# Patient Record
Sex: FEMALE | Race: WHITE | HISPANIC OR LATINO
[De-identification: names, ages, dates, MRNs, and addresses within clinical notes are randomized per-mention and may not be internally consistent; named-entity substitution may affect disease eponyms.]

---

## 2018-08-22 ENCOUNTER — APPOINTMENT (OUTPATIENT)
Dept: MAMMOGRAPHY | Facility: CLINIC | Age: 43
End: 2018-08-22

## 2018-08-22 ENCOUNTER — OUTPATIENT (OUTPATIENT)
Dept: OUTPATIENT SERVICES | Facility: HOSPITAL | Age: 43
LOS: 1 days | End: 2018-08-22
Payer: COMMERCIAL

## 2018-08-22 DIAGNOSIS — Z00.00 ENCOUNTER FOR GENERAL ADULT MEDICAL EXAMINATION WITHOUT ABNORMAL FINDINGS: ICD-10-CM

## 2018-08-22 PROCEDURE — 77063 BREAST TOMOSYNTHESIS BI: CPT

## 2018-08-22 PROCEDURE — 77067 SCR MAMMO BI INCL CAD: CPT | Mod: 26

## 2018-08-22 PROCEDURE — 77067 SCR MAMMO BI INCL CAD: CPT

## 2018-08-22 PROCEDURE — 77063 BREAST TOMOSYNTHESIS BI: CPT | Mod: 26

## 2019-05-14 ENCOUNTER — OUTPATIENT (OUTPATIENT)
Dept: OUTPATIENT SERVICES | Facility: HOSPITAL | Age: 44
LOS: 1 days | End: 2019-05-14
Payer: COMMERCIAL

## 2019-05-14 ENCOUNTER — APPOINTMENT (OUTPATIENT)
Dept: ULTRASOUND IMAGING | Facility: CLINIC | Age: 44
End: 2019-05-14
Payer: COMMERCIAL

## 2019-05-14 DIAGNOSIS — N64.89 OTHER SPECIFIED DISORDERS OF BREAST: ICD-10-CM

## 2019-05-14 PROCEDURE — 76641 ULTRASOUND BREAST COMPLETE: CPT | Mod: 26,50

## 2019-05-14 PROCEDURE — 76641 ULTRASOUND BREAST COMPLETE: CPT

## 2021-04-17 ENCOUNTER — APPOINTMENT (OUTPATIENT)
Dept: MAMMOGRAPHY | Facility: CLINIC | Age: 46
End: 2021-04-17
Payer: COMMERCIAL

## 2021-04-17 ENCOUNTER — OUTPATIENT (OUTPATIENT)
Dept: OUTPATIENT SERVICES | Facility: HOSPITAL | Age: 46
LOS: 1 days | End: 2021-04-17
Payer: COMMERCIAL

## 2021-04-17 DIAGNOSIS — Z12.31 ENCOUNTER FOR SCREENING MAMMOGRAM FOR MALIGNANT NEOPLASM OF BREAST: ICD-10-CM

## 2021-04-17 PROCEDURE — 77067 SCR MAMMO BI INCL CAD: CPT | Mod: 26

## 2021-04-17 PROCEDURE — 77063 BREAST TOMOSYNTHESIS BI: CPT | Mod: 26

## 2021-04-17 PROCEDURE — 77063 BREAST TOMOSYNTHESIS BI: CPT

## 2021-04-17 PROCEDURE — 77067 SCR MAMMO BI INCL CAD: CPT

## 2021-05-31 ENCOUNTER — EMERGENCY (EMERGENCY)
Facility: HOSPITAL | Age: 46
LOS: 1 days | Discharge: HOME | End: 2021-05-31
Attending: STUDENT IN AN ORGANIZED HEALTH CARE EDUCATION/TRAINING PROGRAM
Payer: COMMERCIAL

## 2021-05-31 VITALS
TEMPERATURE: 98 F | RESPIRATION RATE: 18 BRPM | DIASTOLIC BLOOD PRESSURE: 102 MMHG | OXYGEN SATURATION: 99 % | HEART RATE: 86 BPM | SYSTOLIC BLOOD PRESSURE: 165 MMHG

## 2021-05-31 VITALS
RESPIRATION RATE: 18 BRPM | OXYGEN SATURATION: 98 % | DIASTOLIC BLOOD PRESSURE: 81 MMHG | TEMPERATURE: 98 F | HEART RATE: 64 BPM | SYSTOLIC BLOOD PRESSURE: 124 MMHG

## 2021-05-31 LAB
ALBUMIN SERPL ELPH-MCNC: 3.9 G/DL — SIGNIFICANT CHANGE UP (ref 3.3–5.2)
ALP SERPL-CCNC: 62 U/L — SIGNIFICANT CHANGE UP (ref 40–120)
ALT FLD-CCNC: 14 U/L — SIGNIFICANT CHANGE UP
ANION GAP SERPL CALC-SCNC: 8 MMOL/L — SIGNIFICANT CHANGE UP (ref 5–17)
AST SERPL-CCNC: 20 U/L — SIGNIFICANT CHANGE UP
BASOPHILS # BLD AUTO: 0.02 K/UL — SIGNIFICANT CHANGE UP (ref 0–0.2)
BASOPHILS NFR BLD AUTO: 0.3 % — SIGNIFICANT CHANGE UP (ref 0–2)
BILIRUB SERPL-MCNC: <0.2 MG/DL — LOW (ref 0.4–2)
BUN SERPL-MCNC: 19.1 MG/DL — SIGNIFICANT CHANGE UP (ref 8–20)
CALCIUM SERPL-MCNC: 8.6 MG/DL — SIGNIFICANT CHANGE UP (ref 8.6–10.2)
CHLORIDE SERPL-SCNC: 97 MMOL/L — LOW (ref 98–107)
CO2 SERPL-SCNC: 31 MMOL/L — HIGH (ref 22–29)
CREAT SERPL-MCNC: 0.67 MG/DL — SIGNIFICANT CHANGE UP (ref 0.5–1.3)
EOSINOPHIL # BLD AUTO: 0.11 K/UL — SIGNIFICANT CHANGE UP (ref 0–0.5)
EOSINOPHIL NFR BLD AUTO: 1.5 % — SIGNIFICANT CHANGE UP (ref 0–6)
GLUCOSE SERPL-MCNC: 93 MG/DL — SIGNIFICANT CHANGE UP (ref 70–99)
HCT VFR BLD CALC: 37.9 % — SIGNIFICANT CHANGE UP (ref 34.5–45)
HGB BLD-MCNC: 12.4 G/DL — SIGNIFICANT CHANGE UP (ref 11.5–15.5)
IMM GRANULOCYTES NFR BLD AUTO: 0.3 % — SIGNIFICANT CHANGE UP (ref 0–1.5)
LIDOCAIN IGE QN: 69 U/L — HIGH (ref 22–51)
LYMPHOCYTES # BLD AUTO: 2.88 K/UL — SIGNIFICANT CHANGE UP (ref 1–3.3)
LYMPHOCYTES # BLD AUTO: 38.8 % — SIGNIFICANT CHANGE UP (ref 13–44)
MAGNESIUM SERPL-MCNC: 1.8 MG/DL — SIGNIFICANT CHANGE UP (ref 1.6–2.6)
MCHC RBC-ENTMCNC: 28.3 PG — SIGNIFICANT CHANGE UP (ref 27–34)
MCHC RBC-ENTMCNC: 32.7 GM/DL — SIGNIFICANT CHANGE UP (ref 32–36)
MCV RBC AUTO: 86.5 FL — SIGNIFICANT CHANGE UP (ref 80–100)
MONOCYTES # BLD AUTO: 0.96 K/UL — HIGH (ref 0–0.9)
MONOCYTES NFR BLD AUTO: 12.9 % — SIGNIFICANT CHANGE UP (ref 2–14)
NEUTROPHILS # BLD AUTO: 3.44 K/UL — SIGNIFICANT CHANGE UP (ref 1.8–7.4)
NEUTROPHILS NFR BLD AUTO: 46.2 % — SIGNIFICANT CHANGE UP (ref 43–77)
PLATELET # BLD AUTO: 218 K/UL — SIGNIFICANT CHANGE UP (ref 150–400)
POTASSIUM SERPL-MCNC: 3.6 MMOL/L — SIGNIFICANT CHANGE UP (ref 3.5–5.3)
POTASSIUM SERPL-SCNC: 3.6 MMOL/L — SIGNIFICANT CHANGE UP (ref 3.5–5.3)
PROT SERPL-MCNC: 6.5 G/DL — LOW (ref 6.6–8.7)
RBC # BLD: 4.38 M/UL — SIGNIFICANT CHANGE UP (ref 3.8–5.2)
RBC # FLD: 12.6 % — SIGNIFICANT CHANGE UP (ref 10.3–14.5)
SODIUM SERPL-SCNC: 136 MMOL/L — SIGNIFICANT CHANGE UP (ref 135–145)
TROPONIN T SERPL-MCNC: <0.01 NG/ML — SIGNIFICANT CHANGE UP (ref 0–0.06)
WBC # BLD: 7.43 K/UL — SIGNIFICANT CHANGE UP (ref 3.8–10.5)
WBC # FLD AUTO: 7.43 K/UL — SIGNIFICANT CHANGE UP (ref 3.8–10.5)

## 2021-05-31 PROCEDURE — 71045 X-RAY EXAM CHEST 1 VIEW: CPT | Mod: 26

## 2021-05-31 PROCEDURE — 83735 ASSAY OF MAGNESIUM: CPT

## 2021-05-31 PROCEDURE — 71045 X-RAY EXAM CHEST 1 VIEW: CPT

## 2021-05-31 PROCEDURE — 84484 ASSAY OF TROPONIN QUANT: CPT

## 2021-05-31 PROCEDURE — 93010 ELECTROCARDIOGRAM REPORT: CPT

## 2021-05-31 PROCEDURE — 80053 COMPREHEN METABOLIC PANEL: CPT

## 2021-05-31 PROCEDURE — 99285 EMERGENCY DEPT VISIT HI MDM: CPT

## 2021-05-31 PROCEDURE — 99284 EMERGENCY DEPT VISIT MOD MDM: CPT | Mod: 25

## 2021-05-31 PROCEDURE — 36415 COLL VENOUS BLD VENIPUNCTURE: CPT

## 2021-05-31 PROCEDURE — 83690 ASSAY OF LIPASE: CPT

## 2021-05-31 PROCEDURE — 85025 COMPLETE CBC W/AUTO DIFF WBC: CPT

## 2021-05-31 PROCEDURE — 93005 ELECTROCARDIOGRAM TRACING: CPT

## 2021-05-31 RX ADMIN — Medication 30 MILLILITER(S): at 20:56

## 2021-05-31 NOTE — ED ADULT NURSE NOTE - OBJECTIVE STATEMENT
pt arrived c/o palpations since 6pm tonight starting while she was washing dishes. pt denies pain, but states she felt like she was going to pass out when it started. upon arrival to ED pt states relief of symptoms. has no complaints at this time. aao x4. respirations even and unlabored. NSR on cm. no distress noted. DR Duenas at bedside.

## 2021-05-31 NOTE — ED PROVIDER NOTE - NSFOLLOWUPINSTRUCTIONS_ED_ALL_ED_FT
1) Follow up with your doctor or cardiologist within one week  2) Return to the ER for worsening or concerning symptoms      Heart Palpitations    WHAT YOU NEED TO KNOW:    Heart palpitations are feelings that your heart races, jumps, throbs, or flutters. You may feel extra beats, no beats for a short time, or skipped beats. You may have these feelings in your chest, throat, or neck. They may happen when you are sitting, standing, or lying. Heart palpitations may be frightening, but are usually not caused by a serious problem.     DISCHARGE INSTRUCTIONS:    Call 911 or have someone else call for any of the following:   •You have any of the following signs of a heart attack: ?Squeezing, pressure, or pain in your chest      ?You may also have any of the following: ?Discomfort or pain in your back, neck, jaw, stomach, or arm      ?Shortness of breath      ?Nausea or vomiting      ?Lightheadedness or a sudden cold sweat        •You have any of the following signs of a stroke: ?Numbness or drooping on one side of your face       ?Weakness in an arm or leg      ?Confusion or difficulty speaking      ?Dizziness, a severe headache, or vision loss      •You faint or lose consciousness.       Return to the emergency department if:   •Your palpitations happen more often or get more intense.           Contact your healthcare provider if:   •You have new or worsening swelling in your feet or ankles.      •You have questions or concerns about your condition or care.      Follow up with your healthcare provider as directed: You may need to follow up with a cardiologist. You may need tests to check for heart problems that cause palpitations. Write down your questions so you remember to ask them during your visits.     Keep a record: Write down when your palpitations start and stop, what you were doing when they started, and your symptoms. Keep track of what you ate or drank within a few hours of your palpitations. Include anything that seemed to help your symptoms, such as lying down or holding your breath. This record will help you and your healthcare provider learn what triggers your palpitations. Bring this record with you to your follow up visits.    Help prevent heart palpitations:   •Manage stress and anxiety. Find ways to relax such as listening to music, meditating, or doing yoga. Exercise can also help decrease stress and anxiety. Talk to someone you trust about your stress or anxiety. You can also talk to a therapist.       •Get plenty of sleep every night. Ask your healthcare provider how much sleep you need each night.       •Do not drink caffeine or alcohol. Caffeine and alcohol can make your palpitations worse. Caffeine is found in soda, coffee, tea, chocolate, and drinks that increase your energy.       •Do not smoke. Nicotine and other chemicals in cigarettes and cigars may damage your heart and blood vessels. Ask your healthcare provider for information if you currently smoke and need help to quit. E-cigarettes or smokeless tobacco still contain nicotine. Talk to your healthcare provider before you use these products.       •Do not use illegal drugs. Talk to your healthcare provider if you use illegal drugs and want help to quit.       Palpitaciones cardíacas    LO QUE NECESITA SABER:    Las palpitaciones cardíacas son sensaciones que se perciben ga aceleraciones, wes, latidos o aleteos del corazón. También podría sentir latidos adicionales, que francisco corazón no late por un corto periodo de tiempo o que se saltean los latidos. Puede percibir estas sensaciones en el pecho, la garganta o el rito. Pueden presentarse cuando está sentado, de pie o acostado. Las palpitaciones cardíacas pueden causar temor; sin embargo, generalmente no se deben a un problema importante.    [facility]CCIONES [facility]BRE [facility]:    Llame al 911 o pídale a alguien más que llame en cualquiera de los siguientes casos:  •Tiene alguno de los siguientes signos de un ataque cardíaco: ?Estrujamiento, presión o tensión en francisco pecho      ?Usted también podría presentar alguno de los siguientes: ?Malestar o dolor en francisco espalda, rito, mandíbula, abdomen, o brazo      ?Falta de aliento      ?Náuseas o vómitos      ?Desvanecimiento o sudor frío repentino        •Usted tiene alguno de los siguientes signos de derrame cerebral: ?Adormecimiento o caída de un lado de francisco sangita      ?Debilidad en un brazo o carri pierna      ?Confusión o debilidad para hablar      ?Mareos o dolor de eva intenso, o pérdida de la visión.      •Usted se desmaya o pierde el conocimiento.      Regrese a la rosi de emergencias si:  •Preethi palpitaciones ocurren con más frecuencia o son más intensas.          Comuníquese con francisco médico si:  •Usted tiene nueva inflamación en preethi pies o tobillos o esta ha empeorado.      •Usted tiene preguntas o inquietudes acerca de francisco condición o cuidado.      Acuda a preethi consultas de control con francisco médico según le indicaron.Es posible que necesite un seguimiento con un cardiólogo. Joel vez deba hacerse exámenes para determinar si sufre problemas cardíacos que le causan las palpitaciones. Anote preethi preguntas para que se acuerde de hacerlas gretchen preethi visitas.    Mantenga un registro:Escriba cuándo preethi palpitaciones comienzan y terminan, qué estaba usted haciendo cuando comenzaron y preethi síntomas. Mantenga un registro de lo que usted comió o tomó gretchen las horas antes de preethi palpitaciones. Incluya todo lo que aparentemente le ayudó a que preethi síntomas mejoraran ga acostarse o contener francisco respiración. Mary registro le ayudará a usted y a francisco médico para saber qué provoca preethi palpitaciones. Lleve el registro con usted a preethi citas de seguimiento.    Cómo ayudar a prevenir las palpitaciones cardíacas:  •Controlar el estrés y la ansiedad.Encuentre formas de relajarse, ga escuchar música, meditar o hacer yoga. El ejercicio también puede ayudar a disminuir el estrés y la ansiedad. Hablar con alguien de confianza acerca de francisco estrés o ansiedad. También puede hablar con un psicoterapeuta.      •Duerma lo suficiente cada la noche.Pregunte a francisco médico cuánto debería dormir usted cada noche.      •No tome bebidas con cafeína o alcohol.La cafeína y el alcohol pueden hacer que preethi palpitaciones empeoren. La cafeína se encuentra en refrescos, café, té, chocolate y bebidas que aumentan francisco energía.      •No fume.La nicotina y otros químicos en los cigarrillos y cigarros podrían provocar daño a francisco corazón y a preethi vasos sanguíneos. Pida información a francisco médico si usted actualmente fuma y necesita ayuda para dejar de fumar. Los cigarrillos electrónicos o el tabaco sin humo igualmente contienen nicotina. Consulte con francisco médico antes de utilizar estos productos.      •No use drogas ilegales.Hable con francisco médico si consume drogas ilegales y quiere dejarlas.        Hypertension    WHAT YOU NEED TO KNOW:    Hypertension is high blood pressure. Your blood pressure is the force of your blood moving against the walls of your arteries. Hypertension causes your blood pressure to get so high that your heart has to work much harder than normal. This can damage your heart. The cause of hypertension may not be known. This is called essential or primary hypertension. Hypertension caused by another medical condition, such as kidney disease, is called secondary hypertension.    DISCHARGE INSTRUCTIONS:    Call your local emergency number (911 in the [place]) or have someone call if:   •You have chest pain.      •You have any of the following signs of a heart attack: ?Squeezing, pressure, or pain in your chest      ?You may also have any of the following: ?Discomfort or pain in your back, neck, jaw, stomach, or arm      ?Shortness of breath      ?Nausea or vomiting      ?Lightheadedness or a sudden cold sweat        •You become confused or have trouble speaking.      •You suddenly feel lightheaded or have trouble breathing.      Return to the emergency department if:   •You have a severe headache or vision loss.      •You have weakness in an arm or leg.      Call your doctor if:   •You feel faint, dizzy, confused, or drowsy.      •You have been taking your blood pressure medicine but your pressure is higher than your provider says it should be.      •You have questions or concerns about your condition or care.      Medicines: You may need any of the following:  •Antihypertensives may be used to help lower your blood pressure. Several kinds of medicines are available. Your healthcare provider will choose medicines based on the kind of hypertension you have. You may need more than one type of medicine. Take the medicine exactly as directed.      •Diuretics help decrease extra fluid that collects in your body. This will help lower your blood pressure. You may urinate more often while you take this medicine.      •Cholesterol medicine helps lower your cholesterol level. A low cholesterol level helps prevent heart disease and makes it easier to control your blood pressure.      •Take your medicine as directed. Contact your healthcare provider if you think your medicine is not helping or if you have side effects. Tell him or her if you are allergic to any medicine. Keep a list of the medicines, vitamins, and herbs you take. Include the amounts, and when and why you take them. Bring the list or the pill bottles to follow-up visits. Carry your medicine list with you in case of an emergency.      Follow up with your doctor as directed: You will need to return to have your blood pressure checked and to have other lab tests done. Write down your questions so you remember to ask them during your visits.    Stages of hypertension:     Blood Pressure Readings     •Normal blood pressure is 119/79 or lower. Your healthcare provider may only check your blood pressure each year if it stays at a normal level.      •Elevated blood pressure is 120/79 to 129/79. This is sometimes called prehypertension. Your healthcare provider may suggest lifestyle changes to help lower your blood pressure to a normal level. He or she may then check it again in 3 to 6 months.      •Stage 1 hypertension is 130/80 to 139/89. Your provider may recommend lifestyle changes, medication, and checks every 3 to 6 months until your blood pressure is controlled.      •Stage 2 hypertension is 140/90 or higher. Your provider will recommend lifestyle changes and have you take 2 kinds of hypertension medicines. You will also need to have your blood pressure checked monthly until it is controlled.      Manage hypertension:   •Check your blood pressure at home. Avoid smoking, caffeine, and exercise at least 30 minutes before checking your blood pressure. Sit and rest for 5 minutes before you take your blood pressure. Extend your arm and support it on a flat surface. Your arm should be at the same level as your heart. Follow the directions that came with your blood pressure monitor. Check your blood pressure 2 times, 1 minute apart, before you take your medicine in the morning. Also check your blood pressure before your evening meal. Keep a record of your readings and bring it to your follow-up visits. Ask your healthcare provider what your blood pressure should be.  How to take a Blood Pressure           •Manage any other health conditions you have. Health conditions such as diabetes can increase your risk for hypertension. Follow your healthcare provider's instructions and take all your medicines as directed.      •Ask about all medicines. Certain medicines can increase your blood pressure. Examples include oral birth control pills, decongestants, herbal supplements, and NSAIDs, such as ibuprofen. Your healthcare provider can tell you which medicines are safe for you to take. This includes prescription and over-the-counter medicines.      Lifestyle changes you can make to manage hypertension: Your healthcare provider may recommend you work with a team to manage hypertension. The team may include medical experts such as a dietitian, an exercise or physical therapist, and a behavior therapist. Your family members may be included in helping you create lifestyle changes.  •Limit sodium (salt) as directed. Too much sodium can affect your fluid balance. Check labels to find low-sodium or no-salt-added foods. Some low-sodium foods use potassium salts for flavor. Too much potassium can also cause health problems. Your healthcare provider will tell you how much sodium and potassium are safe for you to have in a day. He or she may recommend that you limit sodium to 2,300 mg a day.             •Follow the meal plan recommended by your healthcare provider. A dietitian or your provider can give you more information on low-sodium plans or the DASH (Dietary Approaches to Stop Hypertension) eating plan. The DASH plan is low in sodium, processed sugar, unhealthy fats, and total fat. It is high in potassium, calcium, and fiber. These can be found in vegetables, fruit, and whole-grain foods.             •Be physically active throughout the day. Physical activity, such as exercise, can help control your blood pressure and your weight. Be physically active for at least 30 minutes per day, on most days of the week. Include aerobic activity, such as walking or riding a bicycle. Also include strength training at least 2 times each week. Your healthcare providers can help you create a physical activity plan.   Family Walking for Exercise       Strength Training for Adults           •Decrease stress. This may help lower your blood pressure. Learn ways to relax, such as deep breathing or listening to music.      •Limit alcohol as directed. Alcohol can increase your blood pressure. A drink of alcohol is 12 ounces of beer, 5 ounces of wine, or 1½ ounces of liquor.      •Do not smoke. Nicotine and other chemicals in cigarettes and cigars can increase your blood pressure and also cause lung damage. Ask your healthcare provider for information if you currently smoke and need help to quit. E-cigarettes or smokeless tobacco still contain nicotine. Talk to your healthcare provider before you use these products.  Prevent Heart Disease     Hipertensión    LO QUE NECESITA SABER:    La hipertensión es la presión arterial kaushal. La presión arterial es la fuerza que ejerce la celine al moverse contra las otero de las arterias. La hipertensión causa que francisco presión arterial se eleve tanto que francisco corazón se ve forzado a trabajar mucho más daniel que lo normal. [place] puede dañar francisco corazón. Puede que no se conozca la causa de la hipertensión. [place] se denomina hipertensión esencial o primaria. La hipertensión causada por otra afección médica, tales ga la enfermedad renal, se denomina hipertensión secundaria.    INSTRUCCIONES SOBRE [facility]:    Llame al número local de emergencias (911 en los [place]) o pídale a alguien que llame si:  •Usted tiene dolor en el pecho.      •Tiene alguno de los siguientes signos de un ataque cardíaco: ?Estrujamiento, presión o tensión en francisco pecho      ?Usted también podría presentar alguno de los siguientes: ?Malestar o dolor en francisco espalda, rito, mandíbula, abdomen, o brazo      ?Falta de aliento      ?Náuseas o vómitos      ?Desvanecimiento o sudor frío repentino        •Usted se siente confundido o tiene dificultad para hablar.      •Repentinamente se siente aturdido o con dificultad para respirar.      Regrese a la rosi de emergencias si:  •Usted tiene un daniel dolor de eva o pérdida de la visión.      •Usted tiene debilidad en un brazo o en carri pierna.      Llame a francisco médico si:  •Usted se siente mareado, confundido, somnoliento o ga si se fuera a desmayar.      •Usted ha estado tomando medicamento para la presión arterial yves todavía está en un nivel más elevado del que francisco médico le indicó que debería estar.      •Usted tiene preguntas o inquietudes acerca de francisco condición o cuidado.      Medicamentos:Es posible que usted necesite alguno de los siguientes:  •Los antihipertensivospodrían usarse para ayudar a disminuir la presión arterial. Varios tipos de medicamentos están disponibles. Francisco médico elegirá medicamentos basados en el tipo de hipertensión que tiene. Es posible que necesite más de un tipo de medicamento. [place] el medicamento exactamente ga indicado.      •Los diuréticosayudan a eliminar el exceso de líquido que se acumula en el organismo. [place] ayudará a bajar francisco presión arterial. Es posible que orine más seguido mientras migel mary medicamento.      •Los medicamentos para el colesterolayudan a bajar los niveles de colesterol. Un nivel bajo de colesterol ayuda a prevenir enfermedades cardíacas y facilita el control de la presión arterial.      •[place] preethi medicamentos ga se le haya indicado.Consulte con francisco médico si usted cristy que francisco medicamento no le está ayudando o si presenta efectos secundarios. Infórmele si es alérgico a cualquier medicamento. Mantenga carri lista actualizada de los medicamentos, las vitaminas y los productos herbales que migel. Incluya los siguientes datos de los medicamentos: cantidad, frecuencia y motivo de administración. Traiga con usted la lista o los envases de las píldoras a preethi citas de seguimiento. Lleve la lista de los medicamentos con usted en yenny de carri emergencia.      Acuda a la consulta de control con francisco médico según las indicaciones:Usted necesitará regresar para medir francisco presión arterial y realizar otros exámenes de laboratorio. Anote preethi preguntas para que se acuerde de hacerlas gretchen preethi visitas.    [place] de la hipertensión:    Lecturas de la presión arterial     •Carri presión arterial normal es 119/79 o inferior. Francisco médico podría comprobar francisco presión arterial solamente cada año si se mantiene en un nivel normal.      •Carri presión arterial elevada es de 120/79 a 129/79. A veces esto se llama prehipertensión. Francisco médico puede sugerir cambios de estilo de denzel para ayudar a bajar la presión arterial a un nivel normal. Entonces él podría comprobar francisco presión otra vez en 3 a 6 meses.      •La etapa 1 de la hipertensión es de 130/80 a 139/89. Francisco médico podría recomendar cambios de estilo de denzel, medicamentos y controles cada 3 a 6 meses hasta que francisco presión arterial esté controlada.      •La etapa 2 de la hipertensión es de 140/90 o mayor. Francisco médico le recomendará cambios en el estilo de denzel y le indicará 2 clases de medicamentos para la hipertensión. También necesitará controlar francisco presión arterial cada mes hasta que esté controlada.      Maneje francisco hipertensión:  •Controle francisco presión arterial en casa.Evite fumar, consumir cafeína y hacer ejercicio al menos 30 minutos antes de controlar francisco presión arterial. Siéntese y descanse por 5 minutos antes de tomarse la presión arterial. Extienda francisco brazo y apóyelo en carri superficie plana. Francisco brazo debe estar a la misma altura que francisco corazón. Siga las instrucciones que vienen con el monitor para la presión arterial. Controle francisco presión arterial 2 veces, con diferencia de 1 minuto, antes de mir francisco medicamento por la mañana. También controle francisco presión arterial antes de la carol. Mantenga un registro de francisco peso y llévelo con usted a las citas de control. Pregúntele a francisco médico cuál debería ser francisco presión arterial.  Cómo mir la presión arterial           •Controle cualquier otra condición médica que usted tenga.Algunas condiciones médicas ga la diabetes pueden aumentar francisco riesgo de hipertensión. Siga las instrucciones de francisco médico y tómese preethi medicamentos según dichas instrucciones.      •Pregunte sobre los medicamentos.Ciertos medicamentos pueden aumentar francisco presión arterial. Los ejemplos incluyen las píldoras anticonceptivas orales, los descongestivos, los suplementos herbales y los ITZ, ga el ibuprofeno. Francisco médico puede indicarle qué medicamentos son seguros para usted. Estos medicamentos incluyen los recetados y de venta jing.      Cambios de estilo de denzel que usted puede hacer para manejar la hipertensión:Francisco médico puede recomendarle que trabaje con un equipo para controlar la hipertensión. El equipo puede incluir expertos médicos, ga un dietista, un fisioterapeuta o un terapeuta del comportamiento. Los miembros de francisco zuleika pueden participar en la creación de cambios en el estilo de denzel.  •Limite el sodio (la sal) ga se le haya indicado.Demasiado sodio puede afectar el equilibrio de líquidos. Revise las etiquetas para buscar alimentos bajos en sodio o sin sal agregada. Algunos alimentos bajos en sodio utilizan sales de potasio para añadir sabor. Demasiado potasio también puede causar problemas de lora. Francisco médico le dirá qué cantidad de sodio y potasio es lee para el consumo en un día. Él puede recomendarle que limite el sodio a 2,300 mg al día.             •Siga el plan de comidas recomendado por francisco médico.Un dietista o médico puede darle más información sobre planes de bajo contenido de sodio o el plan de alimentación DASH (enfoques dietéticos para detener la hipertensión). El plan DASH es bajo en sodio, azúcar procesada, grasas dañinas y grasas totales. Es alto en potasio, calcio y fibra. Estos se encuentran en las verduras, las frutas y los alimentos integrales.             •Manténgase físicamente activo gretchen todo el día.La actividad física, ga el ejercicio, puede ayudar a controlar francisco presión arterial y francisco peso. Layla actividad física por lo menos 30 minutos al día, la mayoría de los días de la semana. Incluya carri actividad aeróbica, ga caminar o montar en bicicleta. Incluya también entrenamiento de fuerza al menos 2 veces por semana. Preethi médicos pueden ayudarle a crear un plan de actividad física.  Zuleika hispana caminando ga ejercicio       Entrenamiento de fuerza para adultos           •Disminuya el estrés.Es posible que esto contribuya a bajar francisco presión arterial. Aprenda sobre formas de relajarse, ga respiración profunda o escuchar música.      •Limite el consumo de alcohol según le indicaron.El alcohol puede aumentar la presión arterial. Un trago equivale a 12 onzas de cerveza, 5 onzas de vino o 1 onza y ½ de licor.      •No fume.La nicotina y otros químicos en los cigarrillos y cigarros pueden aumentar francisco presión arterial y también provocar daño al pulmón. Pida información a francisco médico si usted actualmente fuma y necesita ayuda para dejar de fumar. Los cigarrillos electrónicos o el tabaco sin humo igualmente contienen nicotina. Consulte con francisco médico antes de utilizar estos productos.  Evite la enfermedad cardíaca

## 2021-05-31 NOTE — ED PROVIDER NOTE - CHPI ED SYMPTOMS NEG
no back pain/no chest pain/no cough/no dizziness/no fever/no shortness of breath/no syncope/no vomiting/no chills/no diaphoresis

## 2021-05-31 NOTE — ED PROVIDER NOTE - CLINICAL SUMMARY MEDICAL DECISION MAKING FREE TEXT BOX
Patient with brief episode of palpitations which has resolved. No significant findings at this time.

## 2021-05-31 NOTE — ED PROVIDER NOTE - PATIENT PORTAL LINK FT
You can access the FollowMyHealth Patient Portal offered by [facility] by registering at the following website: [email or web address]. By joining [employer]’s FollowMyHealth portal, you will also be able to view your health information using other applications (apps) compatible with our system.

## 2021-05-31 NOTE — ED PROVIDER NOTE - OBJECTIVE STATEMENT
Patient presents for evaluation of brief episode of palpitations that occurred about 630 pm. Patient with recently had HCTZ added to her TN regimen this past saturday after being seen by her primary care doctor due to recurrent htn this week. Patient also endorses poor sleep the past week for unclear reason.

## 2021-05-31 NOTE — ED ADULT TRIAGE NOTE - CHIEF COMPLAINT QUOTE
Pt. BIBA for palpitations. Pt. states they lasted ten minutes, denies chest pain or SOB. Pt. also complaining of pressure behind her neck.  Pt. states this has happened in the past, never seen for it  before. Pt. has hx of HTN. Pt. has NS in from EMS.

## 2021-05-31 NOTE — ED ADULT NURSE NOTE - EXTENSIONS OF SELF_ADULT
Call [phone], Monday through Friday between the hours of 9 a.m. and 4 p.m. to speak with a representative or schedule your initial screening consultation     None

## 2021-08-03 ENCOUNTER — EMERGENCY (EMERGENCY)
Facility: HOSPITAL | Age: 46
LOS: 1 days | Discharge: HOME | End: 2021-08-03
Attending: EMERGENCY MEDICINE
Payer: COMMERCIAL

## 2021-08-03 VITALS
HEART RATE: 69 BPM | RESPIRATION RATE: 14 BRPM | OXYGEN SATURATION: 100 % | SYSTOLIC BLOOD PRESSURE: 152 MMHG | DIASTOLIC BLOOD PRESSURE: 87 MMHG

## 2021-08-03 VITALS
RESPIRATION RATE: 20 BRPM | HEART RATE: 72 BPM | TEMPERATURE: 98 F | HEIGHT: 62 IN | OXYGEN SATURATION: 98 % | WEIGHT: 160.06 LBS | DIASTOLIC BLOOD PRESSURE: 100 MMHG | SYSTOLIC BLOOD PRESSURE: 158 MMHG

## 2021-08-03 LAB
ALBUMIN SERPL ELPH-MCNC: 4.6 G/DL — SIGNIFICANT CHANGE UP (ref 3.3–5.2)
ALP SERPL-CCNC: 65 U/L — SIGNIFICANT CHANGE UP (ref 40–120)
ALT FLD-CCNC: 18 U/L — SIGNIFICANT CHANGE UP
ANION GAP SERPL CALC-SCNC: 12 MMOL/L — SIGNIFICANT CHANGE UP (ref 5–17)
AST SERPL-CCNC: 22 U/L — SIGNIFICANT CHANGE UP
BASOPHILS # BLD AUTO: 0.03 K/UL — SIGNIFICANT CHANGE UP (ref 0–0.2)
BASOPHILS NFR BLD AUTO: 0.3 % — SIGNIFICANT CHANGE UP (ref 0–2)
BILIRUB SERPL-MCNC: 0.3 MG/DL — LOW (ref 0.4–2)
BUN SERPL-MCNC: 13.7 MG/DL — SIGNIFICANT CHANGE UP (ref 8–20)
CALCIUM SERPL-MCNC: 9.6 MG/DL — SIGNIFICANT CHANGE UP (ref 8.6–10.2)
CHLORIDE SERPL-SCNC: 99 MMOL/L — SIGNIFICANT CHANGE UP (ref 98–107)
CO2 SERPL-SCNC: 26 MMOL/L — SIGNIFICANT CHANGE UP (ref 22–29)
CREAT SERPL-MCNC: 0.58 MG/DL — SIGNIFICANT CHANGE UP (ref 0.5–1.3)
EOSINOPHIL # BLD AUTO: 0.05 K/UL — SIGNIFICANT CHANGE UP (ref 0–0.5)
EOSINOPHIL NFR BLD AUTO: 0.6 % — SIGNIFICANT CHANGE UP (ref 0–6)
GLUCOSE SERPL-MCNC: 102 MG/DL — HIGH (ref 70–99)
HCT VFR BLD CALC: 41.6 % — SIGNIFICANT CHANGE UP (ref 34.5–45)
HGB BLD-MCNC: 13.8 G/DL — SIGNIFICANT CHANGE UP (ref 11.5–15.5)
IMM GRANULOCYTES NFR BLD AUTO: 0.2 % — SIGNIFICANT CHANGE UP (ref 0–1.5)
LYMPHOCYTES # BLD AUTO: 3.01 K/UL — SIGNIFICANT CHANGE UP (ref 1–3.3)
LYMPHOCYTES # BLD AUTO: 34.8 % — SIGNIFICANT CHANGE UP (ref 13–44)
MAGNESIUM SERPL-MCNC: 2.1 MG/DL — SIGNIFICANT CHANGE UP (ref 1.6–2.6)
MCHC RBC-ENTMCNC: 28.4 PG — SIGNIFICANT CHANGE UP (ref 27–34)
MCHC RBC-ENTMCNC: 33.2 GM/DL — SIGNIFICANT CHANGE UP (ref 32–36)
MCV RBC AUTO: 85.6 FL — SIGNIFICANT CHANGE UP (ref 80–100)
MONOCYTES # BLD AUTO: 0.77 K/UL — SIGNIFICANT CHANGE UP (ref 0–0.9)
MONOCYTES NFR BLD AUTO: 8.9 % — SIGNIFICANT CHANGE UP (ref 2–14)
NEUTROPHILS # BLD AUTO: 4.76 K/UL — SIGNIFICANT CHANGE UP (ref 1.8–7.4)
NEUTROPHILS NFR BLD AUTO: 55.2 % — SIGNIFICANT CHANGE UP (ref 43–77)
PLATELET # BLD AUTO: 227 K/UL — SIGNIFICANT CHANGE UP (ref 150–400)
POTASSIUM SERPL-MCNC: 3.8 MMOL/L — SIGNIFICANT CHANGE UP (ref 3.5–5.3)
POTASSIUM SERPL-SCNC: 3.8 MMOL/L — SIGNIFICANT CHANGE UP (ref 3.5–5.3)
PROT SERPL-MCNC: 7.9 G/DL — SIGNIFICANT CHANGE UP (ref 6.6–8.7)
RBC # BLD: 4.86 M/UL — SIGNIFICANT CHANGE UP (ref 3.8–5.2)
RBC # FLD: 12.6 % — SIGNIFICANT CHANGE UP (ref 10.3–14.5)
SODIUM SERPL-SCNC: 137 MMOL/L — SIGNIFICANT CHANGE UP (ref 135–145)
TROPONIN T SERPL-MCNC: <0.01 NG/ML — SIGNIFICANT CHANGE UP (ref 0–0.06)
TSH SERPL-MCNC: 0.54 UIU/ML — SIGNIFICANT CHANGE UP (ref 0.27–4.2)
WBC # BLD: 8.64 K/UL — SIGNIFICANT CHANGE UP (ref 3.8–10.5)
WBC # FLD AUTO: 8.64 K/UL — SIGNIFICANT CHANGE UP (ref 3.8–10.5)

## 2021-08-03 PROCEDURE — 99284 EMERGENCY DEPT VISIT MOD MDM: CPT

## 2021-08-03 PROCEDURE — 99284 EMERGENCY DEPT VISIT MOD MDM: CPT | Mod: 25

## 2021-08-03 PROCEDURE — 36415 COLL VENOUS BLD VENIPUNCTURE: CPT

## 2021-08-03 PROCEDURE — 84443 ASSAY THYROID STIM HORMONE: CPT

## 2021-08-03 PROCEDURE — 85025 COMPLETE CBC W/AUTO DIFF WBC: CPT

## 2021-08-03 PROCEDURE — 80053 COMPREHEN METABOLIC PANEL: CPT

## 2021-08-03 PROCEDURE — 83735 ASSAY OF MAGNESIUM: CPT

## 2021-08-03 PROCEDURE — 84484 ASSAY OF TROPONIN QUANT: CPT

## 2021-08-03 PROCEDURE — 93010 ELECTROCARDIOGRAM REPORT: CPT

## 2021-08-03 PROCEDURE — 93005 ELECTROCARDIOGRAM TRACING: CPT

## 2021-08-03 PROCEDURE — 71046 X-RAY EXAM CHEST 2 VIEWS: CPT | Mod: 26

## 2021-08-03 PROCEDURE — 71046 X-RAY EXAM CHEST 2 VIEWS: CPT

## 2021-08-03 PROCEDURE — 96374 THER/PROPH/DIAG INJ IV PUSH: CPT

## 2021-08-03 RX ORDER — METOCLOPRAMIDE HCL 10 MG
10 TABLET ORAL ONCE
Refills: 0 | Status: COMPLETED | OUTPATIENT
Start: 2021-08-03 | End: 2021-08-03

## 2021-08-03 RX ORDER — ACETAMINOPHEN 500 MG
650 TABLET ORAL ONCE
Refills: 0 | Status: COMPLETED | OUTPATIENT
Start: 2021-08-03 | End: 2021-08-03

## 2021-08-03 RX ORDER — SODIUM CHLORIDE 9 MG/ML
1000 INJECTION INTRAMUSCULAR; INTRAVENOUS; SUBCUTANEOUS ONCE
Refills: 0 | Status: COMPLETED | OUTPATIENT
Start: 2021-08-03 | End: 2021-08-03

## 2021-08-03 RX ADMIN — Medication 650 MILLIGRAM(S): at 21:16

## 2021-08-03 RX ADMIN — SODIUM CHLORIDE 1000 MILLILITER(S): 9 INJECTION INTRAMUSCULAR; INTRAVENOUS; SUBCUTANEOUS at 21:16

## 2021-08-03 RX ADMIN — Medication 104 MILLIGRAM(S): at 21:16

## 2021-08-03 NOTE — ED PROVIDER NOTE - PATIENT PORTAL LINK FT
You can access the FollowMyHealth Patient Portal offered by [facility] by registering at the following website: [email or web address]. By joining [employer]’s FollowMyHealth portal, you will also be able to view your health information using other applications (apps) compatible with our system.

## 2021-08-03 NOTE — ED ADULT NURSE NOTE - NSIMPLEMENTINTERV_GEN_ALL_ED
Implemented All Fall with Harm Risk Interventions:  [place] to call system. Call bell, personal items and telephone within reach. Instruct patient to call for assistance. Room bathroom lighting operational. Non-slip footwear when patient is off stretcher. Physically safe environment: no spills, clutter or unnecessary equipment. Stretcher in lowest position, wheels locked, appropriate side rails in place. Provide visual cue, wrist band, yellow gown, etc. Monitor gait and stability. Monitor for mental status changes and reorient to person, place, and time. Review medications for side effects contributing to fall risk. Reinforce activity limits and safety measures with patient and family. Provide visual clues: red socks.

## 2021-08-03 NOTE — ED PROVIDER NOTE - CARE PLAN
Principal Discharge DX:	HA (headache)   Principal Discharge DX:	HA (headache)  Secondary Diagnosis:	Palpitations

## 2021-08-03 NOTE — ED PROVIDER NOTE - CLINICAL SUMMARY MEDICAL DECISION MAKING FREE TEXT BOX
45 y/o F with PMH HTN and palpitations presents with HA and palpitations x days, today her palpitations acutely worsened while she was shopping, denies ches tpain or SOB. HA has no other associated symptoms, unrelieved with tylenol. Patient appears well, rolling around on the bed, neurologically intact, EKG WNL, will control HA, CXR, cardiac evaluation and reassess.

## 2021-08-03 NOTE — ED PROVIDER NOTE - NSFOLLOWUPINSTRUCTIONS_ED_ALL_ED_FT
- Consulte con francisco médico en los próximos 3 días, yves busque atención médica antes si taras síntomas persisten o empeoran. Llame mañana para concertar carri damien. Si no puede hacer un seguimiento con francisco médico de atención primaria, regrese al servicio de urgencias por cualquier problema urgente.    - Se le entregó carri copia de taras laboratorios e imágenes. Revíselos con francisco (s) médico (s).    - Si tiene algún empeoramiento de los síntomas o cualquier otra inquietud, consulte a francisco médico o regrese a la rosi de emergencias más cercana de inmediato.    - Siga tomando taras medicamentos caseros según las indicaciones. No consuma alcohol cuando esté tomando algún medicamento (especialmente antibióticos, tylenol u otros analgésicos) a menos que consulte con el médico o farmacéutico.    **********************************************************************************************************

## 2021-08-03 NOTE — ED ADULT NURSE NOTE - OBJECTIVE STATEMENT
Pt A&Ox4.  Pt stated that she has had a headache since Thursday it has just gotten progressively worse.  Pt stated that she also has palpitations and it radiated to the right side of neck.  Pt stated that she has no hx of migraines.  Pt stated that had hx of HTN and takes medications daily for it. Pt on lisinopril and amlodipine.  Will continue to monitor.

## 2021-08-03 NOTE — ED PROVIDER NOTE - CARE PROVIDER_API CALL
Benito Cummins)  Cardiovascular Disease  [address] [facility], [place] [address]  Phone: [phone]  Fax: [phone]  Follow Up Time:

## 2021-08-03 NOTE — ED PROVIDER NOTE - ATTENDING CONTRIBUTION TO CARE
I personally saw the patient with the student, and completed the key components of the history and physical exam. I then discussed the management plan with the student. The work up and MDM are mine.    AP - Well appearing, RRR, lungs CTA B/L, no focal neuro deficits, abd soft NT/ND. I personally saw the patient with the student and resident, and completed the key components of the history and physical exam. I then discussed the management plan with the student and resident. The work up and MDM are mine.    AP - Well appearing, RRR, lungs CTA B/L, no focal neuro deficits, abd soft NT/ND.    Patient feels better, results shared, PVCs improved. cardiology follow up.

## 2021-08-03 NOTE — ED PROVIDER NOTE - OBJECTIVE STATEMENT
This is a 47 yo F with a PMH of HTN and palpitations presenting with headache and palpitations. Pt says that her BP has been elevated for the past several days. She saw her PMD but does not remember what her BP reading was. Pt says she has been taking her BP meds as prescribed. She has also had a headache, which she has not been able to control with acetaminophen. At around 16:30 today she started having heart palpitations. Currently, she is complaining of her headache and says that she feels most of the pain in the frontal region. She denies any nausea or vomiting. Pt endorses having episodes similar to this before. Pt denies any fever, dizziness, chest pain, SOB, or abdominal pain.

## 2021-08-04 PROBLEM — I10 ESSENTIAL (PRIMARY) HYPERTENSION: Chronic | Status: ACTIVE | Noted: 2021-05-31

## 2021-09-23 ENCOUNTER — APPOINTMENT (OUTPATIENT)
Dept: ORTHOPEDIC SURGERY | Facility: CLINIC | Age: 46
End: 2021-09-23

## 2021-11-05 ENCOUNTER — APPOINTMENT (OUTPATIENT)
Dept: ORTHOPEDIC SURGERY | Facility: CLINIC | Age: 46
End: 2021-11-05
Payer: COMMERCIAL

## 2021-11-05 VITALS
DIASTOLIC BLOOD PRESSURE: 83 MMHG | SYSTOLIC BLOOD PRESSURE: 144 MMHG | HEIGHT: 60 IN | WEIGHT: 160 LBS | BODY MASS INDEX: 31.41 KG/M2 | HEART RATE: 74 BPM

## 2021-11-05 DIAGNOSIS — M25.561 PAIN IN RIGHT KNEE: ICD-10-CM

## 2021-11-05 PROCEDURE — 99204 OFFICE O/P NEW MOD 45 MIN: CPT

## 2021-11-09 NOTE — DISCUSSION/SUMMARY
[de-identified] : Assessment: 46-year-old female with right knee pain secondary to early arthritis versus medial meniscus tear\par \par Plan: I had a long discussion with the patient today regarding the nature of their diagnosis and treatment plan. We discussed the risks and benefits of no treatment as well as nonoperative and operative treatments.  I reviewed the patient's x-rays today with her in the office which are negative for any acute pathology.  On examination she has had pain along the medial joint line.  At this time I recommend conservative treatment of the patient's condition with modalities including rest, ice, heat, anti-inflammatory medications, activity modifications, and home stretching and strengthening exercises daily.  A prescription for naproxen was sent to the patient's pharmacy today.  I discussed with the patient the risks and benefits associated with NSAID use.  A referral for physical therapy was provided today to begin working on exercises for her knee to help improve her pain and function.  Recommend follow-up in 8 weeks for repeat clinical evaluation.  The patient continues to have pain that time we will obtain an MRI of the knee.\par \par The patient verbalizes their understanding and agrees with the plan.  All questions were answered to their satisfaction.\par \par

## 2021-11-09 NOTE — HISTORY OF PRESENT ILLNESS
[Pain Location] : pain [] : right knee [Worsening] : worsening [___ mths] : [unfilled] month(s) ago [6] : a current pain level of 6/10 [9] : an average pain level of 9/10 [8] : a minimum pain level of 8/10 [10] : a maximum pain level of 10/10 [Intermit.] : ~He/She~ states the symptoms seem to be intermittent [Direct Pressure] : worsened by direct pressure [Walking] : worsened by walking [Knee Flexion] : worsened with knee flexion [Knee Extension] : worsened with knee extension [Rest] : relieved by rest [de-identified] : Abimbola is a pleasant 46-year-old female who presents to the office today complaining of right knee pain.  The patient states that the pain began several months ago but she denies any history of trauma.  The pain is activity related and alleviated by rest.  Hurts to squat or twist on the knee.  She is currently not taking any for pain.  She has not had any therapy or injections for her knee before.  The patient denies any fevers, chills, sweats, recent illnesses, numbness, tingling, weakness, or pain elsewhere at this time.  The patient denies any fevers, chills, sweats, recent illnesses, numbness, tingling, weakness, or pain elsewhere at this time.

## 2021-11-09 NOTE — PHYSICAL EXAM
[de-identified] : General:\par Awake, alert, no acute distress, Patient was cooperative and appropriate during the examination.\par \par The patient is of normal weight for height and age.\par \par Walks without an antalgic gait.\par \par Full, painless range of motion of the neck and back.\par \par Exam of the bilateral lower extremities is intact and symmetric with regards to dermatologic, vascular, and neurologic exam. Bilateral lower extremity sensation is grossly intact to light touch in the DP/SP/T/S/S nerve distributions. Intact DF/PF/EHL. BIlateral lower extremity warm and well-perfused with brisk capillary refill.\par \par Pulmonary:\par Regular, nonlabored breathing\par \par Abdomen:\par Soft, nontender, nondistended.\par \par Lymphatic:\par No evidence of inguinal lymphadenopathy\par \par Right Knee Examination:\par Physical examination of the knee demonstrates normal skin without signs of skin changes or abnormalities. No erythema or warmth is appreciated. There is a mild joint effusion.\par \par Sensation is intact to light touch L2-S1\par Palpable DP/PT pulse\par EHL/FHL/TA/GSC motor function intact\par \par Range of Motion\par 0 to 130 degrees with pain at terminal flexion\par \par Strength Testing\par Quadriceps/Hamstring 5/5\par Patient is able to perform a straight leg raise without difficulty.\par \par Palpation\par Not tender to palpation about the distal femur, proximal tibia, or patella\par No palpable defect appreciated in the quadriceps or patellar tendons\par Moderately tender to palpation of medial joint line\par Mildly tender to palpation of lateral joint line\par \par Special Tests\par Anterior Drawer negative\par Posterior Drawer negative\par Lachman Exam negative\par No Varus or Valgus Laxity at 0 or 30 degrees of knee flexion\par Viri's Test positive medially\par Active compression of the patella is negative for pain\par Translation of the patella less than 2 quadrants with a firm endpoint [de-identified] : X-rays including 3 views of the right knee from [facility] radiology on 8/20/2021 reviewed the patient today in the office.  There is no acute fracture or dislocation.  There are mild degenerative changes.  There is a joint effusion in the suprapatellar recess.

## 2021-11-09 NOTE — REVIEW OF SYSTEMS
[Joint Pain] : joint pain [Joint Stiffness] : joint stiffness [Negative] : Heme/Lymph [FreeTextEntry9] : right knee pain

## 2021-11-15 ENCOUNTER — APPOINTMENT (OUTPATIENT)
Dept: NEUROLOGY | Facility: CLINIC | Age: 46
End: 2021-11-15
Payer: COMMERCIAL

## 2021-11-15 VITALS
DIASTOLIC BLOOD PRESSURE: 90 MMHG | BODY MASS INDEX: 31.41 KG/M2 | HEIGHT: 60 IN | SYSTOLIC BLOOD PRESSURE: 140 MMHG | WEIGHT: 160 LBS

## 2021-11-15 DIAGNOSIS — G44.89 OTHER HEADACHE SYNDROME: ICD-10-CM

## 2021-11-15 DIAGNOSIS — Z86.79 PERSONAL HISTORY OF OTHER DISEASES OF THE CIRCULATORY SYSTEM: ICD-10-CM

## 2021-11-15 PROCEDURE — 99204 OFFICE O/P NEW MOD 45 MIN: CPT

## 2021-11-15 RX ORDER — LISINOPRIL 30 MG/1
TABLET ORAL
Refills: 0 | Status: ACTIVE | COMMUNITY

## 2021-11-15 RX ORDER — NAPROXEN 500 MG/1
500 TABLET ORAL
Qty: 28 | Refills: 1 | Status: COMPLETED | COMMUNITY
Start: 2021-11-05 | End: 2021-11-15

## 2021-12-05 ENCOUNTER — APPOINTMENT (OUTPATIENT)
Dept: MRI IMAGING | Facility: CLINIC | Age: 46
End: 2021-12-05
Payer: COMMERCIAL

## 2021-12-05 ENCOUNTER — OUTPATIENT (OUTPATIENT)
Dept: OUTPATIENT SERVICES | Facility: HOSPITAL | Age: 46
LOS: 1 days | End: 2021-12-05
Payer: COMMERCIAL

## 2021-12-05 DIAGNOSIS — Z00.8 ENCOUNTER FOR OTHER GENERAL EXAMINATION: ICD-10-CM

## 2021-12-05 DIAGNOSIS — G44.89 OTHER HEADACHE SYNDROME: ICD-10-CM

## 2021-12-05 PROCEDURE — 70553 MRI BRAIN STEM W/O & W/DYE: CPT | Mod: 26

## 2021-12-05 PROCEDURE — 70553 MRI BRAIN STEM W/O & W/DYE: CPT

## 2021-12-05 PROCEDURE — A9585: CPT

## 2021-12-07 ENCOUNTER — NON-APPOINTMENT (OUTPATIENT)
Age: 46
End: 2021-12-07

## 2022-06-04 ENCOUNTER — APPOINTMENT (OUTPATIENT)
Dept: MAMMOGRAPHY | Facility: CLINIC | Age: 47
End: 2022-06-04

## 2023-02-28 ENCOUNTER — OFFICE (OUTPATIENT)
Dept: URBAN - METROPOLITAN AREA CLINIC 94 | Facility: CLINIC | Age: 48
Setting detail: OPHTHALMOLOGY
End: 2023-02-28
Payer: MEDICAID

## 2023-02-28 DIAGNOSIS — H11.153: ICD-10-CM

## 2023-02-28 DIAGNOSIS — H43.393: ICD-10-CM

## 2023-02-28 DIAGNOSIS — H11.041: ICD-10-CM

## 2023-02-28 DIAGNOSIS — H16.223: ICD-10-CM

## 2023-02-28 DIAGNOSIS — H01.004: ICD-10-CM

## 2023-02-28 DIAGNOSIS — H01.001: ICD-10-CM

## 2023-02-28 DIAGNOSIS — H25.13: ICD-10-CM

## 2023-02-28 PROCEDURE — 99213 OFFICE O/P EST LOW 20 MIN: CPT | Performed by: REGISTERED NURSE

## 2023-02-28 PROCEDURE — 92250 FUNDUS PHOTOGRAPHY W/I&R: CPT | Performed by: REGISTERED NURSE

## 2023-02-28 ASSESSMENT — REFRACTION_MANIFEST
OS_SPHERE: +1.75
OS_ADD: +2.25
OD_CYLINDER: SPHERE
OS_AXIS: 110
OD_ADD: +2.25
OS_CYLINDER: -0.50
OD_SPHERE: +1.75
OS_VA1: 20/20
OD_VA1: 20/20

## 2023-02-28 ASSESSMENT — SPHEQUIV_DERIVED
OD_SPHEQUIV: 2.125
OS_SPHEQUIV: 1.5
OS_SPHEQUIV: 1.875

## 2023-02-28 ASSESSMENT — REFRACTION_CURRENTRX
OD_SPHERE: +1.25
OS_VPRISM_DIRECTION: PROGS
OS_OVR_VA: 20/
OD_SPHERE: +1.50
OS_SPHERE: +1.50
OS_SPHERE: +1.25
OD_OVR_VA: 20/
OS_AXIS: 000
OD_OVR_VA: 20/
OD_VPRISM_DIRECTION: PROGS
OS_CYLINDER: 0.00
OD_VPRISM_DIRECTION: SV
OD_CYLINDER: 0.00
OS_OVR_VA: 20/
OD_ADD: +2.25
OD_AXIS: 000
OS_ADD: +2.25
OS_VPRISM_DIRECTION: SV

## 2023-02-28 ASSESSMENT — CORNEAL PTERYGIUM: OD_PTERYGIUM: NASAL 1MM

## 2023-02-28 ASSESSMENT — LID EXAM ASSESSMENTS
OS_MEIBOMITIS: LLL 2+
OD_MEIBOMITIS: RLL 2+
OD_BLEPHARITIS: RUL 1+
OS_BLEPHARITIS: LUL 1+

## 2023-02-28 ASSESSMENT — REFRACTION_AUTOREFRACTION
OS_SPHERE: +2.25
OD_SPHERE: +2.25
OS_AXIS: 113
OS_CYLINDER: -0.75
OD_AXIS: 118
OD_CYLINDER: -0.25

## 2023-02-28 ASSESSMENT — TEAR BREAK UP TIME (TBUT)
OS_TBUT: 1SEC
OD_TBUT: 1SEC

## 2023-02-28 ASSESSMENT — SUPERFICIAL PUNCTATE KERATITIS (SPK)
OS_SPK: T
OD_SPK: T

## 2023-02-28 ASSESSMENT — VISUAL ACUITY
OD_BCVA: 20/25-1
OS_BCVA: 20/25-1

## 2023-02-28 ASSESSMENT — LID POSITION - DERMATOCHALASIS
OD_DERMATOCHALASIS: RUL 2+
OS_DERMATOCHALASIS: LUL 2+

## 2023-02-28 ASSESSMENT — CONFRONTATIONAL VISUAL FIELD TEST (CVF)
OD_FINDINGS: FULL
OS_FINDINGS: FULL

## 2023-03-14 NOTE — ED PROVIDER NOTE - EYES, MLM
OT Current Status-Daily Note


Subjective


Pt alert, lying in bed.  Pt agrees to therapy.  No c/o pain at this time.  Co-

treat with PT [id number], skills of 2 clinicians required to decrease fall risk, 

work on standing and B LE/UE strengthening while increasing stamina for daily 

functional tasks.  PT focusing on transfers, standing and w/c mobility with OT 

focusing on ADLs, R UE placement during mobility and skilled support with 

standing tasks.





Mental Status/Objective


Patient Orientation:  Person, Place, Non-Verbal/Aphasic, Time, Situation


Attachments:  [place] Catheter, IV, PEG Tube





ADL-Treatment


After supplies gathered and education on one handed technique, pt able to 

complete oral care by self sitting EOB.  Using sit to stand lift for transfers 

at this time.  Pt requires assistance to manipulate any clothing at this time.  

Max A for supine to EOB then sat EOB independently.


Therapy Code Descriptions/Definitions 





Functional [place] Measure:


0=Not Assessed/NA        4=Minimal Assistance


1=Total Assistance        5=Supervision or Setup


2=Maximal Assistance  6=Modified [place]


3=Moderate Assistance 7=Complete IndependenceSCALE: Activities may be completed 

with or without assistive devices.





6-Indepedent-patient completes the activity by him/herself with no assistance 

from a helper.


5-Set-up or Clean-up Assistance-helper sets up or cleans up; patient completes 

activity. [place] assists only prior to or  


    following the activity.


4-Supervision or Touching Assistance-helper provides verbal cues and/or 

touching/steadying and/or contact guard assistance as patient completes 

activity. Assistance may be provided   


    throughout the activity or intermittently.


3-Partial/Moderate Assistance-helper does LESS THAN HALF the effort. [place] 

lifts, holds or supports trunk or limbs, but provides less than half the effort.


2-Substantial/Maximal Assistance-helper does MORE THAN HALF the effort. [place] 

lifts or holds trunk or limbs and provides more than half the effort.


5-Aypkmgfyc-qmxlwa does ALL the effort. Patient does none of the effort to 

complete the activity. Or, the assistance of 2 or more helpers is required for 

the patient to complete the  


    activity.


If activity was not attempted, code reason:


7-Patient Refused.


9-Not Applicable-not attempted and the patient did not perform the activity 

before the current illness, exacerbation or injury.


10-Not Attempted due to Environmental Limitations-(lack of equipment, weather 

restraints, etc.).


88-Not Attempted due to Medical Conditions or Safety Concerns.


Oral Hygiene (QC):  4





Other Treatment


See PT notes for attempt standing.  Mod A needed to propel w/c with straight and

turns.  Dependent transfers.  Pt is demonstrating active R shldr flex/ext, bicep

flex/ext, forearm pron/sup and finger flex/ext though continues to be 

uncoordinated and weak.   After therapy, pt sitting in recliner with call 

light/phone in reach.  All needs met in room.





OT Short Term Goals


Short Term Goals


Time Frame:  Mar 28, 2023


Eating:  3


Toileting hygiene:  3


Shower/bathe self:  3


Lower body dressing:  3


Putting on/taking off footwear:  3





OT Long Term Goals


Long Term Goals


Time Frame:  2023


Acute change in mental status:  1


Inattention:  0


Disorganized thinkin


Altered level of consciousness:  0


Eating (QC):  5


Oral Hygiene (QC):  5


Toileting Hygiene (QC):  4


Shower/Bathe Self (QC):  4


Upper Body Dressing (QC):  5


Lower Body Dressing (QC):  4


On/Off Footwear (QC):  4


Additional Goals:  1-Demonstrate ADL Tasks, 2-Verbalize Understanding, 

3-ImproveStrength/Russ


1=Demonstrate adherence to instructed precautions during ADL tasks.


2=Patient will verbalize/demonstrate understanding of assistive 

devices/modifications for ADL.


3=Patient will improve strength/tolerance for activity to enable patient to 

perform ADL's.





OT Education/Plan


Problem List/Assessment


Assessment:  Decreased Activ Tolerance, Decreased UE Strength, Dependent 

Transfers, Impaired Bed Mobility, Impaired Coordination, Impaired Funct Balance,

Impaired I ADL's, Impaired Self-Care Skills, Restricted Funct UE ROM





Discharge Recommendations


Plan/Recommendations:  Continue POC





Treatment Plan/Plan of Care


Patient would benefit from OT for education, treatment and training to promote 

independence in ADL's, mobility, safety and/or upper extremity function for 

ADL's.


Plan of Care:  ADL Retraining, Functional Mobility, Group Exercise/Act as Ind, 

UE Funct Exercise/Act, UE Neuromus Re-Ed/Coord, Visual/Perceptual Retrain, W/C 

Management Training


Treatment Duration:  2023


Frequency:  At least 5 of 7 days/Wk (IRF)


Estimated Hrs Per Day:  1.5 hours per day


Agreement:  Yes


Rehab Potential:  Fair





Time


Start Time:  10:00


Stop Time:  11:00


DATE:  Mar 14, 2023


Total Time Billed (hr/min):  60


Billed Treatment Time


1 visit-ADL 2 (30 min)  FA 2 (30 min)  co-treat with PT [id number]











ARTIE MC               Mar 14, 2023 11:18 Clear bilaterally, pupils equal, round and reactive to light.

## 2023-04-12 ENCOUNTER — NON-APPOINTMENT (OUTPATIENT)
Age: 48
End: 2023-04-12

## 2023-04-14 ENCOUNTER — OFFICE (OUTPATIENT)
Dept: URBAN - METROPOLITAN AREA CLINIC 94 | Facility: CLINIC | Age: 48
Setting detail: OPHTHALMOLOGY
End: 2023-04-14
Payer: MEDICAID

## 2023-04-14 DIAGNOSIS — H02.423: ICD-10-CM

## 2023-04-14 DIAGNOSIS — H02.421: ICD-10-CM

## 2023-04-14 DIAGNOSIS — H02.524: ICD-10-CM

## 2023-04-14 DIAGNOSIS — H02.521: ICD-10-CM

## 2023-04-14 DIAGNOSIS — H02.831: ICD-10-CM

## 2023-04-14 DIAGNOSIS — H02.422: ICD-10-CM

## 2023-04-14 DIAGNOSIS — H02.834: ICD-10-CM

## 2023-04-14 PROCEDURE — 92082 INTERMEDIATE VISUAL FIELD XM: CPT | Performed by: OPHTHALMOLOGY

## 2023-04-14 PROCEDURE — 92285 EXTERNAL OCULAR PHOTOGRAPHY: CPT | Performed by: OPHTHALMOLOGY

## 2023-04-14 PROCEDURE — 92014 COMPRE OPH EXAM EST PT 1/>: CPT | Performed by: OPHTHALMOLOGY

## 2023-04-14 ASSESSMENT — LID EXAM ASSESSMENTS
OD_BLEPHARITIS: RUL 1+
OS_BLEPHARITIS: LUL 1+
OD_MEIBOMITIS: RLL 2+
OS_MEIBOMITIS: LLL 2+

## 2023-04-14 ASSESSMENT — TEAR BREAK UP TIME (TBUT)
OD_TBUT: 1SEC
OS_TBUT: 1SEC

## 2023-04-14 ASSESSMENT — REFRACTION_CURRENTRX
OS_AXIS: 000
OS_CYLINDER: 0.00
OD_CYLINDER: 0.00
OD_ADD: +2.25
OD_SPHERE: +1.25
OS_VPRISM_DIRECTION: PROGS
OS_ADD: +2.25
OD_VPRISM_DIRECTION: PROGS
OD_AXIS: 000
OD_OVR_VA: 20/
OD_OVR_VA: 20/
OD_VPRISM_DIRECTION: SV
OS_SPHERE: +1.25
OD_SPHERE: +1.50
OS_VPRISM_DIRECTION: SV
OS_OVR_VA: 20/
OS_SPHERE: +1.50
OS_OVR_VA: 20/

## 2023-04-14 ASSESSMENT — KERATOMETRY
OD_AXISANGLE_DEGREES: 088
OS_AXISANGLE_DEGREES: 094
METHOD_AUTO_MANUAL: AUTO
OS_K2POWER_DIOPTERS: 48.75
OS_K1POWER_DIOPTERS: 48.00
OD_K1POWER_DIOPTERS: 48.00
OD_K2POWER_DIOPTERS: 49.75

## 2023-04-14 ASSESSMENT — CONFRONTATIONAL VISUAL FIELD TEST (CVF)
OD_FINDINGS: FULL
OS_FINDINGS: FULL

## 2023-04-14 ASSESSMENT — TONOMETRY
OD_IOP_MMHG: 14
OS_IOP_MMHG: 15

## 2023-04-14 ASSESSMENT — REFRACTION_AUTOREFRACTION
OS_CYLINDER: -0.75
OD_AXIS: 118
OS_SPHERE: +2.25
OS_AXIS: 113
OD_SPHERE: +2.25
OD_CYLINDER: -0.25

## 2023-04-14 ASSESSMENT — AXIALLENGTH_DERIVED
OS_AL: 21.3142
OS_AL: 21.4341
OD_AL: 21.09

## 2023-04-14 ASSESSMENT — REFRACTION_MANIFEST
OD_CYLINDER: SPHERE
OS_CYLINDER: -0.50
OS_AXIS: 110
OD_SPHERE: +1.75
OD_VA1: 20/20
OS_ADD: +2.25
OS_SPHERE: +1.75
OD_ADD: +2.25
OS_VA1: 20/20

## 2023-04-14 ASSESSMENT — SUPERFICIAL PUNCTATE KERATITIS (SPK)
OS_SPK: T
OD_SPK: T

## 2023-04-14 ASSESSMENT — SPHEQUIV_DERIVED
OS_SPHEQUIV: 1.5
OS_SPHEQUIV: 1.875
OD_SPHEQUIV: 2.125

## 2023-04-14 ASSESSMENT — VISUAL ACUITY
OD_BCVA: 20/25-1
OS_BCVA: 20/30

## 2023-04-14 ASSESSMENT — CORNEAL PTERYGIUM: OD_PTERYGIUM: NASAL 1MM

## 2023-06-07 ENCOUNTER — ASC (OUTPATIENT)
Dept: URBAN - METROPOLITAN AREA SURGERY 8 | Facility: SURGERY | Age: 48
Setting detail: OPHTHALMOLOGY
End: 2023-06-07
Payer: MEDICAID

## 2023-06-07 DIAGNOSIS — H02.524: ICD-10-CM

## 2023-06-07 DIAGNOSIS — H02.521: ICD-10-CM

## 2023-06-07 DIAGNOSIS — H02.423: ICD-10-CM

## 2023-06-07 PROCEDURE — 67904 REPAIR EYELID DEFECT: CPT | Performed by: OPHTHALMOLOGY

## 2023-06-07 PROCEDURE — 67900 REPAIR BROW DEFECT: CPT | Performed by: OPHTHALMOLOGY

## 2023-06-08 ENCOUNTER — RX ONLY (RX ONLY)
Age: 48
End: 2023-06-08

## 2023-06-08 ENCOUNTER — OFFICE (OUTPATIENT)
Dept: URBAN - METROPOLITAN AREA CLINIC 112 | Facility: CLINIC | Age: 48
Setting detail: OPHTHALMOLOGY
End: 2023-06-08
Payer: MEDICAID

## 2023-06-08 DIAGNOSIS — H02.422: ICD-10-CM

## 2023-06-08 DIAGNOSIS — H02.421: ICD-10-CM

## 2023-06-08 DIAGNOSIS — H02.834: ICD-10-CM

## 2023-06-08 DIAGNOSIS — H02.423: ICD-10-CM

## 2023-06-08 DIAGNOSIS — H02.521: ICD-10-CM

## 2023-06-08 DIAGNOSIS — H02.831: ICD-10-CM

## 2023-06-08 DIAGNOSIS — H02.524: ICD-10-CM

## 2023-06-08 PROCEDURE — 99024 POSTOP FOLLOW-UP VISIT: CPT | Performed by: PHYSICIAN ASSISTANT

## 2023-06-08 ASSESSMENT — REFRACTION_CURRENTRX
OD_VPRISM_DIRECTION: PROGS
OS_VPRISM_DIRECTION: PROGS
OS_OVR_VA: 20/
OD_SPHERE: +1.50
OS_CYLINDER: 0.00
OD_OVR_VA: 20/
OS_SPHERE: +1.25
OS_SPHERE: +1.50
OD_SPHERE: +1.25
OD_ADD: +2.25
OS_ADD: +2.25
OS_AXIS: 000
OD_OVR_VA: 20/
OS_OVR_VA: 20/
OS_VPRISM_DIRECTION: SV
OD_CYLINDER: 0.00
OD_VPRISM_DIRECTION: SV
OD_AXIS: 000

## 2023-06-08 ASSESSMENT — REFRACTION_AUTOREFRACTION
OS_CYLINDER: -0.75
OS_SPHERE: +2.25
OS_AXIS: 113
OD_SPHERE: +2.25
OD_AXIS: 118
OD_CYLINDER: -0.25

## 2023-06-08 ASSESSMENT — KERATOMETRY
OD_K2POWER_DIOPTERS: 49.75
METHOD_AUTO_MANUAL: AUTO
OD_AXISANGLE_DEGREES: 088
OS_AXISANGLE_DEGREES: 094
OS_K1POWER_DIOPTERS: 48.00
OD_K1POWER_DIOPTERS: 48.00
OS_K2POWER_DIOPTERS: 48.75

## 2023-06-08 ASSESSMENT — AXIALLENGTH_DERIVED
OS_AL: 21.3142
OD_AL: 21.09
OS_AL: 21.4341

## 2023-06-08 ASSESSMENT — VISUAL ACUITY
OD_BCVA: 20/60
OS_BCVA: 20/60

## 2023-06-08 ASSESSMENT — SUPERFICIAL PUNCTATE KERATITIS (SPK)
OS_SPK: T
OD_SPK: T

## 2023-06-08 ASSESSMENT — REFRACTION_MANIFEST
OD_SPHERE: +1.75
OS_VA1: 20/20
OS_AXIS: 110
OS_ADD: +2.25
OD_ADD: +2.25
OD_CYLINDER: SPHERE
OD_VA1: 20/20
OS_CYLINDER: -0.50
OS_SPHERE: +1.75

## 2023-06-08 ASSESSMENT — LID EXAM ASSESSMENTS
OD_BLEPHARITIS: RUL 1+
OD_MEIBOMITIS: RLL 2+
OS_BLEPHARITIS: LUL 1+
OS_MEIBOMITIS: LLL 2+

## 2023-06-08 ASSESSMENT — SPHEQUIV_DERIVED
OD_SPHEQUIV: 2.125
OS_SPHEQUIV: 1.5
OS_SPHEQUIV: 1.875

## 2023-06-08 ASSESSMENT — TEAR BREAK UP TIME (TBUT)
OD_TBUT: 1SEC
OS_TBUT: 1SEC

## 2023-06-08 ASSESSMENT — CORNEAL PTERYGIUM: OD_PTERYGIUM: NASAL 1MM

## 2023-06-08 ASSESSMENT — CONFRONTATIONAL VISUAL FIELD TEST (CVF)
OS_FINDINGS: FULL
OD_FINDINGS: FULL

## 2023-06-18 ENCOUNTER — OFFICE (OUTPATIENT)
Dept: URBAN - METROPOLITAN AREA CLINIC 94 | Facility: CLINIC | Age: 48
Setting detail: OPHTHALMOLOGY
End: 2023-06-18
Payer: MEDICAID

## 2023-06-18 DIAGNOSIS — H02.524: ICD-10-CM

## 2023-06-18 DIAGNOSIS — H02.521: ICD-10-CM

## 2023-06-18 DIAGNOSIS — H02.831: ICD-10-CM

## 2023-06-18 DIAGNOSIS — H02.834: ICD-10-CM

## 2023-06-18 DIAGNOSIS — H02.423: ICD-10-CM

## 2023-06-18 PROCEDURE — 99024 POSTOP FOLLOW-UP VISIT: CPT | Performed by: REGISTERED NURSE

## 2023-06-18 ASSESSMENT — LID EXAM ASSESSMENTS
OD_BLEPHARITIS: RUL 1+
OS_MEIBOMITIS: LLL 2+
OS_BLEPHARITIS: LUL 1+
OD_MEIBOMITIS: RLL 2+

## 2023-06-18 ASSESSMENT — REFRACTION_MANIFEST
OS_AXIS: 110
OD_CYLINDER: SPHERE
OD_SPHERE: +1.75
OD_VA1: 20/20
OS_CYLINDER: -0.50
OD_ADD: +2.25
OS_ADD: +2.25
OS_SPHERE: +1.75
OS_VA1: 20/20

## 2023-06-18 ASSESSMENT — REFRACTION_CURRENTRX
OD_OVR_VA: 20/
OS_AXIS: 000
OS_OVR_VA: 20/
OS_CYLINDER: 0.00
OD_CYLINDER: 0.00
OD_SPHERE: +1.25
OD_ADD: +2.25
OD_VPRISM_DIRECTION: SV
OD_AXIS: 000
OS_SPHERE: +1.25
OS_VPRISM_DIRECTION: SV
OS_SPHERE: +1.50
OD_VPRISM_DIRECTION: PROGS
OS_ADD: +2.25
OD_OVR_VA: 20/
OS_VPRISM_DIRECTION: PROGS
OD_SPHERE: +1.50
OS_OVR_VA: 20/

## 2023-06-18 ASSESSMENT — KERATOMETRY
OS_K1POWER_DIOPTERS: 48.00
OS_K2POWER_DIOPTERS: 49.00
METHOD_AUTO_MANUAL: AUTO
OS_AXISANGLE_DEGREES: 080
OD_K2POWER_DIOPTERS: 49.25
OD_K1POWER_DIOPTERS: 47.75
OD_AXISANGLE_DEGREES: 086

## 2023-06-18 ASSESSMENT — REFRACTION_AUTOREFRACTION
OS_SPHERE: +2.50
OD_SPHERE: +2.50
OS_AXIS: 109
OD_CYLINDER: -0.50
OS_CYLINDER: -1.00
OD_AXIS: 108

## 2023-06-18 ASSESSMENT — TEAR BREAK UP TIME (TBUT)
OS_TBUT: 1SEC
OD_TBUT: 1SEC

## 2023-06-18 ASSESSMENT — TONOMETRY
OD_IOP_MMHG: 14
OS_IOP_MMHG: 15

## 2023-06-18 ASSESSMENT — SPHEQUIV_DERIVED
OS_SPHEQUIV: 2
OS_SPHEQUIV: 1.5
OD_SPHEQUIV: 2.25

## 2023-06-18 ASSESSMENT — VISUAL ACUITY
OS_BCVA: 20/50-1
OD_BCVA: 20/60

## 2023-06-18 ASSESSMENT — SUPERFICIAL PUNCTATE KERATITIS (SPK)
OS_SPK: T
OD_SPK: T

## 2023-06-18 ASSESSMENT — AXIALLENGTH_DERIVED
OD_AL: 21.16
OS_AL: 21.3963
OS_AL: 21.2372

## 2023-06-18 ASSESSMENT — CONFRONTATIONAL VISUAL FIELD TEST (CVF)
OD_FINDINGS: FULL
OS_FINDINGS: FULL

## 2023-06-18 ASSESSMENT — CORNEAL PTERYGIUM: OD_PTERYGIUM: NASAL 1MM

## 2023-06-27 ENCOUNTER — OFFICE (OUTPATIENT)
Dept: URBAN - METROPOLITAN AREA CLINIC 112 | Facility: CLINIC | Age: 48
Setting detail: OPHTHALMOLOGY
End: 2023-06-27
Payer: MEDICAID

## 2023-06-27 DIAGNOSIS — H02.421: ICD-10-CM

## 2023-06-27 DIAGNOSIS — H02.834: ICD-10-CM

## 2023-06-27 DIAGNOSIS — H02.423: ICD-10-CM

## 2023-06-27 DIAGNOSIS — H02.524: ICD-10-CM

## 2023-06-27 DIAGNOSIS — H02.831: ICD-10-CM

## 2023-06-27 DIAGNOSIS — H02.521: ICD-10-CM

## 2023-06-27 DIAGNOSIS — H02.422: ICD-10-CM

## 2023-06-27 PROCEDURE — 99024 POSTOP FOLLOW-UP VISIT: CPT | Performed by: OPHTHALMOLOGY

## 2023-06-27 ASSESSMENT — REFRACTION_MANIFEST
OS_SPHERE: +1.75
OS_CYLINDER: -0.50
OD_SPHERE: +1.75
OD_VA1: 20/20
OD_CYLINDER: SPHERE
OS_VA1: 20/20
OS_AXIS: 110
OS_ADD: +2.25
OD_ADD: +2.25

## 2023-06-27 ASSESSMENT — REFRACTION_CURRENTRX
OD_SPHERE: +1.50
OD_ADD: +2.25
OD_SPHERE: +1.25
OS_VPRISM_DIRECTION: PROGS
OS_OVR_VA: 20/
OD_VPRISM_DIRECTION: SV
OS_AXIS: 000
OS_ADD: +2.25
OS_SPHERE: +1.50
OD_VPRISM_DIRECTION: PROGS
OS_CYLINDER: 0.00
OD_OVR_VA: 20/
OD_OVR_VA: 20/
OS_SPHERE: +1.25
OD_AXIS: 000
OS_VPRISM_DIRECTION: SV
OS_OVR_VA: 20/
OD_CYLINDER: 0.00

## 2023-06-27 ASSESSMENT — AXIALLENGTH_DERIVED
OD_AL: 21.16
OS_AL: 21.2372
OS_AL: 21.3963

## 2023-06-27 ASSESSMENT — REFRACTION_AUTOREFRACTION
OS_AXIS: 109
OS_CYLINDER: -1.00
OD_SPHERE: +2.50
OD_CYLINDER: -0.50
OD_AXIS: 108
OS_SPHERE: +2.50

## 2023-06-27 ASSESSMENT — LID EXAM ASSESSMENTS
OD_BLEPHARITIS: RUL 1+
OS_MEIBOMITIS: LLL 2+
OD_MEIBOMITIS: RLL 2+
OS_BLEPHARITIS: LUL 1+

## 2023-06-27 ASSESSMENT — SPHEQUIV_DERIVED
OS_SPHEQUIV: 1.5
OS_SPHEQUIV: 2
OD_SPHEQUIV: 2.25

## 2023-06-27 ASSESSMENT — VISUAL ACUITY
OS_BCVA: 20/25
OD_BCVA: 20/25

## 2023-06-27 ASSESSMENT — SUPERFICIAL PUNCTATE KERATITIS (SPK)
OD_SPK: T
OS_SPK: T

## 2023-06-27 ASSESSMENT — TEAR BREAK UP TIME (TBUT)
OS_TBUT: 1SEC
OD_TBUT: 1SEC

## 2023-06-27 ASSESSMENT — KERATOMETRY
OD_K1POWER_DIOPTERS: 47.75
METHOD_AUTO_MANUAL: AUTO
OS_K2POWER_DIOPTERS: 49.00
OS_K1POWER_DIOPTERS: 48.00
OD_K2POWER_DIOPTERS: 49.25
OS_AXISANGLE_DEGREES: 080
OD_AXISANGLE_DEGREES: 086

## 2023-06-27 ASSESSMENT — CONFRONTATIONAL VISUAL FIELD TEST (CVF)
OD_FINDINGS: FULL
OS_FINDINGS: FULL

## 2023-06-27 ASSESSMENT — CORNEAL PTERYGIUM: OD_PTERYGIUM: NASAL 1MM

## 2023-06-27 ASSESSMENT — TONOMETRY
OS_IOP_MMHG: 17
OD_IOP_MMHG: 17

## 2023-07-14 ENCOUNTER — OFFICE (OUTPATIENT)
Dept: URBAN - METROPOLITAN AREA CLINIC 112 | Facility: CLINIC | Age: 48
Setting detail: OPHTHALMOLOGY
End: 2023-07-14
Payer: MEDICAID

## 2023-07-14 DIAGNOSIS — H02.831: ICD-10-CM

## 2023-07-14 DIAGNOSIS — H02.834: ICD-10-CM

## 2023-07-14 DIAGNOSIS — H02.521: ICD-10-CM

## 2023-07-14 DIAGNOSIS — H02.422: ICD-10-CM

## 2023-07-14 DIAGNOSIS — H02.421: ICD-10-CM

## 2023-07-14 DIAGNOSIS — H02.423: ICD-10-CM

## 2023-07-14 DIAGNOSIS — H02.524: ICD-10-CM

## 2023-07-14 PROCEDURE — 99024 POSTOP FOLLOW-UP VISIT: CPT | Performed by: REGISTERED NURSE

## 2023-07-14 ASSESSMENT — SPHEQUIV_DERIVED
OS_SPHEQUIV: 1.75
OS_SPHEQUIV: 1.5
OD_SPHEQUIV: 2.125

## 2023-07-14 ASSESSMENT — REFRACTION_MANIFEST
OD_CYLINDER: SPHERE
OD_SPHERE: +1.75
OS_VA1: 20/20
OS_ADD: +2.25
OS_AXIS: 110
OS_SPHERE: +1.75
OD_ADD: +2.25
OS_CYLINDER: -0.50
OD_VA1: 20/20

## 2023-07-14 ASSESSMENT — VISUAL ACUITY
OD_BCVA: 20/30
OS_BCVA: 20/50

## 2023-07-14 ASSESSMENT — REFRACTION_CURRENTRX
OD_CYLINDER: 0.00
OD_AXIS: 000
OS_SPHERE: +1.25
OD_SPHERE: +1.50
OS_OVR_VA: 20/
OD_OVR_VA: 20/
OS_CYLINDER: 0.00
OD_OVR_VA: 20/
OD_ADD: +2.25
OD_SPHERE: +1.25
OD_VPRISM_DIRECTION: PROGS
OD_VPRISM_DIRECTION: SV
OS_SPHERE: +1.50
OS_OVR_VA: 20/
OS_VPRISM_DIRECTION: PROGS
OS_VPRISM_DIRECTION: SV
OS_ADD: +2.25
OS_AXIS: 000

## 2023-07-14 ASSESSMENT — TONOMETRY
OS_IOP_MMHG: 18
OD_IOP_MMHG: 17

## 2023-07-14 ASSESSMENT — REFRACTION_AUTOREFRACTION
OS_CYLINDER: -1.50
OD_SPHERE: +2.25
OD_CYLINDER: -0.25
OS_AXIS: 104
OD_AXIS: 084
OS_SPHERE: +2.50

## 2023-07-14 ASSESSMENT — CORNEAL PTERYGIUM: OD_PTERYGIUM: NASAL 1MM

## 2023-07-14 ASSESSMENT — KERATOMETRY
OD_K2POWER_DIOPTERS: 48.75
OS_K2POWER_DIOPTERS: 48.75
OS_K1POWER_DIOPTERS: 48.25
METHOD_AUTO_MANUAL: AUTO
OD_AXISANGLE_DEGREES: 080
OD_K1POWER_DIOPTERS: 48.00
OS_AXISANGLE_DEGREES: 146

## 2023-07-14 ASSESSMENT — LID EXAM ASSESSMENTS
OD_MEIBOMITIS: RLL 2+
OD_BLEPHARITIS: RUL 1+
OS_MEIBOMITIS: LLL 2+
OS_BLEPHARITIS: LUL 1+

## 2023-07-14 ASSESSMENT — SUPERFICIAL PUNCTATE KERATITIS (SPK)
OD_SPK: T
OS_SPK: T

## 2023-07-14 ASSESSMENT — AXIALLENGTH_DERIVED
OS_AL: 21.3963
OD_AL: 21.235
OS_AL: 21.3165

## 2023-07-14 ASSESSMENT — TEAR BREAK UP TIME (TBUT)
OS_TBUT: 1SEC
OD_TBUT: 1SEC

## 2023-07-25 ENCOUNTER — OFFICE (OUTPATIENT)
Dept: URBAN - METROPOLITAN AREA CLINIC 112 | Facility: CLINIC | Age: 48
Setting detail: OPHTHALMOLOGY
End: 2023-07-25
Payer: MEDICAID

## 2023-07-25 DIAGNOSIS — H02.422: ICD-10-CM

## 2023-07-25 DIAGNOSIS — H02.521: ICD-10-CM

## 2023-07-25 DIAGNOSIS — H02.831: ICD-10-CM

## 2023-07-25 DIAGNOSIS — H02.834: ICD-10-CM

## 2023-07-25 DIAGNOSIS — H02.421: ICD-10-CM

## 2023-07-25 DIAGNOSIS — H02.423: ICD-10-CM

## 2023-07-25 DIAGNOSIS — H02.524: ICD-10-CM

## 2023-07-25 PROCEDURE — 99024 POSTOP FOLLOW-UP VISIT: CPT | Performed by: OPHTHALMOLOGY

## 2023-07-25 ASSESSMENT — CORNEAL PTERYGIUM: OD_PTERYGIUM: NASAL 1MM

## 2023-07-25 ASSESSMENT — REFRACTION_MANIFEST
OS_CYLINDER: -0.50
OD_VA1: 20/20
OS_ADD: +2.25
OS_AXIS: 110
OD_ADD: +2.25
OS_SPHERE: +1.75
OD_CYLINDER: SPHERE
OS_VA1: 20/20
OD_SPHERE: +1.75

## 2023-07-25 ASSESSMENT — LID EXAM ASSESSMENTS
OS_MEIBOMITIS: LLL 2+
OS_BLEPHARITIS: LUL 1+
OD_BLEPHARITIS: RUL 1+
OD_MEIBOMITIS: RLL 2+

## 2023-07-25 ASSESSMENT — REFRACTION_CURRENTRX
OD_ADD: +2.25
OS_AXIS: 000
OD_OVR_VA: 20/
OD_VPRISM_DIRECTION: PROGS
OS_CYLINDER: 0.00
OS_OVR_VA: 20/
OD_CYLINDER: 0.00
OS_OVR_VA: 20/
OD_SPHERE: +1.25
OD_AXIS: 000
OD_VPRISM_DIRECTION: SV
OS_SPHERE: +1.50
OS_ADD: +2.25
OS_VPRISM_DIRECTION: SV
OD_SPHERE: +1.50
OD_OVR_VA: 20/
OS_SPHERE: +1.25
OS_VPRISM_DIRECTION: PROGS

## 2023-07-25 ASSESSMENT — TEAR BREAK UP TIME (TBUT)
OS_TBUT: 1SEC
OD_TBUT: 1SEC

## 2023-07-25 ASSESSMENT — REFRACTION_AUTOREFRACTION
OS_CYLINDER: -1.50
OS_AXIS: 104
OD_CYLINDER: -0.25
OD_AXIS: 084
OS_SPHERE: +2.50
OD_SPHERE: +2.25

## 2023-07-25 ASSESSMENT — AXIALLENGTH_DERIVED
OS_AL: 21.3165
OS_AL: 21.3963
OD_AL: 21.235

## 2023-07-25 ASSESSMENT — TONOMETRY
OD_IOP_MMHG: 15
OS_IOP_MMHG: 16

## 2023-07-25 ASSESSMENT — VISUAL ACUITY
OS_BCVA: 20/60
OD_BCVA: 20/80

## 2023-07-25 ASSESSMENT — KERATOMETRY
OD_AXISANGLE_DEGREES: 080
OD_K2POWER_DIOPTERS: 48.75
OD_K1POWER_DIOPTERS: 48.00
METHOD_AUTO_MANUAL: AUTO
OS_AXISANGLE_DEGREES: 146
OS_K1POWER_DIOPTERS: 48.25
OS_K2POWER_DIOPTERS: 48.75

## 2023-07-25 ASSESSMENT — CONFRONTATIONAL VISUAL FIELD TEST (CVF)
OD_FINDINGS: FULL
OS_FINDINGS: FULL

## 2023-07-25 ASSESSMENT — SUPERFICIAL PUNCTATE KERATITIS (SPK)
OD_SPK: T
OS_SPK: T

## 2023-07-25 ASSESSMENT — SPHEQUIV_DERIVED
OS_SPHEQUIV: 1.75
OD_SPHEQUIV: 2.125
OS_SPHEQUIV: 1.5

## 2023-11-11 ENCOUNTER — OFFICE (OUTPATIENT)
Dept: URBAN - METROPOLITAN AREA CLINIC 112 | Facility: CLINIC | Age: 48
Setting detail: OPHTHALMOLOGY
End: 2023-11-11
Payer: MEDICAID

## 2023-11-11 DIAGNOSIS — H02.831: ICD-10-CM

## 2023-11-11 DIAGNOSIS — H02.423: ICD-10-CM

## 2023-11-11 DIAGNOSIS — H02.521: ICD-10-CM

## 2023-11-11 DIAGNOSIS — H02.524: ICD-10-CM

## 2023-11-11 DIAGNOSIS — H16.223: ICD-10-CM

## 2023-11-11 PROCEDURE — 92012 INTRM OPH EXAM EST PATIENT: CPT | Performed by: OPHTHALMOLOGY

## 2023-11-11 PROCEDURE — 83861 MICROFLUID ANALY TEARS: CPT | Mod: QW | Performed by: OPHTHALMOLOGY

## 2023-11-11 PROCEDURE — 92285 EXTERNAL OCULAR PHOTOGRAPHY: CPT | Performed by: OPHTHALMOLOGY

## 2023-11-11 ASSESSMENT — LID EXAM ASSESSMENTS
OD_BLEPHARITIS: RUL 1+
OS_BLEPHARITIS: LUL 1+
OS_MEIBOMITIS: LLL 2+
OD_MEIBOMITIS: RLL 2+

## 2023-11-11 ASSESSMENT — REFRACTION_CURRENTRX
OS_SPHERE: +1.25
OS_ADD: +2.25
OS_CYLINDER: 0.00
OS_VPRISM_DIRECTION: PROGS
OD_SPHERE: +1.50
OS_SPHERE: +1.50
OD_VPRISM_DIRECTION: PROGS
OD_OVR_VA: 20/
OD_VPRISM_DIRECTION: SV
OD_ADD: +2.25
OD_CYLINDER: 0.00
OD_AXIS: 000
OS_OVR_VA: 20/
OD_OVR_VA: 20/
OS_VPRISM_DIRECTION: SV
OD_SPHERE: +1.25
OS_OVR_VA: 20/
OS_AXIS: 000

## 2023-11-11 ASSESSMENT — REFRACTION_MANIFEST
OS_VA1: 20/20
OS_SPHERE: +1.75
OD_VA1: 20/20
OS_ADD: +2.25
OS_CYLINDER: -0.50
OD_CYLINDER: SPHERE
OS_AXIS: 110
OD_SPHERE: +1.75
OD_ADD: +2.25

## 2023-11-11 ASSESSMENT — TEAR BREAK UP TIME (TBUT)
OD_TBUT: 1SEC
OS_TBUT: 1SEC

## 2023-11-11 ASSESSMENT — CORNEAL PTERYGIUM: OD_PTERYGIUM: NASAL 2MM

## 2023-11-11 ASSESSMENT — SPHEQUIV_DERIVED
OS_SPHEQUIV: 1.5
OD_SPHEQUIV: 2.125
OS_SPHEQUIV: 1.75

## 2023-11-11 ASSESSMENT — REFRACTION_AUTOREFRACTION
OD_AXIS: 084
OD_SPHERE: +2.25
OS_SPHERE: +2.50
OS_AXIS: 104
OD_CYLINDER: -0.25
OS_CYLINDER: -1.50

## 2023-11-11 ASSESSMENT — CONFRONTATIONAL VISUAL FIELD TEST (CVF)
OS_FINDINGS: FULL
OD_FINDINGS: FULL

## 2023-11-11 ASSESSMENT — SUPERFICIAL PUNCTATE KERATITIS (SPK)
OD_SPK: T
OS_SPK: T

## 2024-02-16 ENCOUNTER — OFFICE (OUTPATIENT)
Dept: URBAN - METROPOLITAN AREA CLINIC 94 | Facility: CLINIC | Age: 49
Setting detail: OPHTHALMOLOGY
End: 2024-02-16
Payer: MEDICAID

## 2024-02-16 DIAGNOSIS — H16.223: ICD-10-CM

## 2024-02-16 DIAGNOSIS — H43.393: ICD-10-CM

## 2024-02-16 DIAGNOSIS — H11.041: ICD-10-CM

## 2024-02-16 PROCEDURE — 92250 FUNDUS PHOTOGRAPHY W/I&R: CPT | Performed by: OPHTHALMOLOGY

## 2024-02-16 PROCEDURE — 92012 INTRM OPH EXAM EST PATIENT: CPT | Performed by: OPHTHALMOLOGY

## 2024-02-16 ASSESSMENT — REFRACTION_AUTOREFRACTION
OS_AXIS: 106
OD_SPHERE: +3.00
OD_AXIS: 076
OS_CYLINDER: -0.75
OD_CYLINDER: -0.75
OS_SPHERE: +2.50

## 2024-02-16 ASSESSMENT — REFRACTION_CURRENTRX
OS_CYLINDER: 0.00
OD_AXIS: 000
OD_VPRISM_DIRECTION: SV
OS_VPRISM_DIRECTION: PROGS
OS_OVR_VA: 20/
OD_VPRISM_DIRECTION: PROGS
OD_ADD: +2.25
OS_ADD: +2.25
OD_OVR_VA: 20/
OS_OVR_VA: 20/
OD_SPHERE: +1.25
OS_SPHERE: +1.50
OS_VPRISM_DIRECTION: SV
OS_SPHERE: +1.25
OD_SPHERE: +1.50
OS_AXIS: 000
OD_OVR_VA: 20/
OD_CYLINDER: 0.00

## 2024-02-16 ASSESSMENT — REFRACTION_MANIFEST
OD_SPHERE: +1.75
OS_ADD: +2.25
OD_VA1: 20/20
OD_ADD: +2.25
OD_CYLINDER: SPHERE
OS_AXIS: 110
OS_SPHERE: +1.75
OS_CYLINDER: -0.50
OS_VA1: 20/20

## 2024-02-16 ASSESSMENT — LID EXAM ASSESSMENTS
OS_BLEPHARITIS: LUL 1+
OD_MEIBOMITIS: RLL 2+
OS_MEIBOMITIS: LLL 2+
OD_BLEPHARITIS: RUL 1+

## 2024-02-16 ASSESSMENT — TEAR BREAK UP TIME (TBUT)
OD_TBUT: 1SEC
OS_TBUT: 1SEC

## 2024-02-16 ASSESSMENT — SUPERFICIAL PUNCTATE KERATITIS (SPK)
OS_SPK: T
OD_SPK: T

## 2024-02-16 ASSESSMENT — CORNEAL PTERYGIUM: OD_PTERYGIUM: NASAL 2MM

## 2024-02-16 ASSESSMENT — SPHEQUIV_DERIVED
OD_SPHEQUIV: 2.625
OS_SPHEQUIV: 2.125
OS_SPHEQUIV: 1.5

## 2024-02-16 ASSESSMENT — CONFRONTATIONAL VISUAL FIELD TEST (CVF)
OS_FINDINGS: FULL
OD_FINDINGS: FULL

## 2024-03-22 ENCOUNTER — OFFICE (OUTPATIENT)
Dept: URBAN - METROPOLITAN AREA CLINIC 94 | Facility: CLINIC | Age: 49
Setting detail: OPHTHALMOLOGY
End: 2024-03-22
Payer: MEDICAID

## 2024-03-22 DIAGNOSIS — H02.421: ICD-10-CM

## 2024-03-22 DIAGNOSIS — H16.223: ICD-10-CM

## 2024-03-22 DIAGNOSIS — H02.831: ICD-10-CM

## 2024-03-22 DIAGNOSIS — H02.422: ICD-10-CM

## 2024-03-22 DIAGNOSIS — H02.524: ICD-10-CM

## 2024-03-22 DIAGNOSIS — H02.423: ICD-10-CM

## 2024-03-22 DIAGNOSIS — H11.041: ICD-10-CM

## 2024-03-22 DIAGNOSIS — H02.521: ICD-10-CM

## 2024-03-22 DIAGNOSIS — H02.834: ICD-10-CM

## 2024-03-22 PROCEDURE — 92012 INTRM OPH EXAM EST PATIENT: CPT | Performed by: OPHTHALMOLOGY

## 2024-03-22 ASSESSMENT — REFRACTION_CURRENTRX
OD_SPHERE: +1.50
OD_OVR_VA: 20/
OD_ADD: +2.25
OS_SPHERE: +1.50
OS_OVR_VA: 20/
OS_SPHERE: +1.25
OS_ADD: +2.25
OS_OVR_VA: 20/
OD_VPRISM_DIRECTION: PROGS
OD_CYLINDER: 0.00
OD_SPHERE: +1.25
OS_VPRISM_DIRECTION: SV
OD_OVR_VA: 20/
OS_VPRISM_DIRECTION: PROGS
OD_VPRISM_DIRECTION: SV
OS_AXIS: 000
OD_AXIS: 000
OS_CYLINDER: 0.00

## 2024-03-22 ASSESSMENT — REFRACTION_MANIFEST
OD_ADD: +2.25
OS_ADD: +2.25
OS_SPHERE: +1.75
OS_AXIS: 110
OD_CYLINDER: SPHERE
OS_CYLINDER: -0.50
OS_VA1: 20/20
OD_VA1: 20/20
OD_SPHERE: +1.75

## 2024-03-22 ASSESSMENT — LID EXAM ASSESSMENTS
OS_BLEPHARITIS: LUL 1+
OS_MEIBOMITIS: LLL 2+
OD_MEIBOMITIS: RLL 2+
OD_BLEPHARITIS: RUL 1+

## 2024-03-22 ASSESSMENT — SPHEQUIV_DERIVED: OS_SPHEQUIV: 1.5

## 2024-04-08 ENCOUNTER — OFFICE (OUTPATIENT)
Dept: URBAN - METROPOLITAN AREA CLINIC 116 | Facility: CLINIC | Age: 49
Setting detail: OPHTHALMOLOGY
End: 2024-04-08
Payer: MEDICAID

## 2024-04-08 DIAGNOSIS — H11.041: ICD-10-CM

## 2024-04-08 DIAGNOSIS — H25.13: ICD-10-CM

## 2024-04-08 DIAGNOSIS — H16.223: ICD-10-CM

## 2024-04-08 DIAGNOSIS — H52.4: ICD-10-CM

## 2024-04-08 PROCEDURE — 92014 COMPRE OPH EXAM EST PT 1/>: CPT | Performed by: OPTOMETRIST

## 2024-04-08 PROCEDURE — 92015 DETERMINE REFRACTIVE STATE: CPT | Performed by: OPTOMETRIST

## 2024-04-08 ASSESSMENT — LID EXAM ASSESSMENTS
OD_BLEPHARITIS: RUL 1+
OD_MEIBOMITIS: RLL 2+
OS_MEIBOMITIS: LLL 2+
OS_BLEPHARITIS: LUL 1+

## 2025-08-13 ENCOUNTER — OFFICE (OUTPATIENT)
Dept: URBAN - METROPOLITAN AREA CLINIC 94 | Facility: CLINIC | Age: 50
Setting detail: OPHTHALMOLOGY
End: 2025-08-13
Payer: MEDICAID

## 2025-08-13 DIAGNOSIS — H25.13: ICD-10-CM

## 2025-08-13 DIAGNOSIS — H16.223: ICD-10-CM

## 2025-08-13 DIAGNOSIS — H11.041: ICD-10-CM

## 2025-08-13 DIAGNOSIS — H52.4: ICD-10-CM

## 2025-08-13 PROCEDURE — 92015 DETERMINE REFRACTIVE STATE: CPT | Performed by: OPHTHALMOLOGY

## 2025-08-13 PROCEDURE — 92014 COMPRE OPH EXAM EST PT 1/>: CPT | Performed by: OPHTHALMOLOGY

## 2025-08-13 ASSESSMENT — KERATOMETRY
OS_AXISANGLE_DEGREES: 088
OD_K2POWER_DIOPTERS: 49.75
OS_K2POWER_DIOPTERS: 49.25
OS_K1POWER_DIOPTERS: 48.25
OD_AXISANGLE_DEGREES: 086
METHOD_AUTO_MANUAL: AUTO
OD_K1POWER_DIOPTERS: 48.00

## 2025-08-13 ASSESSMENT — REFRACTION_MANIFEST
OD_VA1: 20/20
OS_SPHERE: +1.75
OD_SPHERE: +2.25
OS_AXIS: 105
OD_AXIS: 018
OD_CYLINDER: SPHERE
OS_SPHERE: +3.00
OS_SPHERE: +2.25
OD_CYLINDER: -0.25
OS_ADD: +1.75
OD_SPHERE: +1.75
OD_SPHERE: +2.25
OD_ADD: +2.25
OD_VA1: 20/30
OU_VA: 20/25
OD_CYLINDER: SPH
OS_CYLINDER: -0.50
OS_VA1: 20/20
OS_CYLINDER: -0.75
OU_VA: 20/20
OS_AXIS: 116
OS_VA1: 20/30
OS_ADD: +2.25
OD_ADD: +1.75
OD_ADD: +2.25
OS_VA1: 20/20
OS_ADD: +2.25
OS_AXIS: 110
OS_CYLINDER: -0.50
OD_VA1: 20/25

## 2025-08-13 ASSESSMENT — LID EXAM ASSESSMENTS
OD_BLEPHARITIS: RUL 1+
OS_MEIBOMITIS: LLL 2+
OS_BLEPHARITIS: LUL 1+
OD_MEIBOMITIS: RLL 2+

## 2025-08-13 ASSESSMENT — CORNEAL PTERYGIUM: OD_PTERYGIUM: NASAL 2MM

## 2025-08-13 ASSESSMENT — REFRACTION_CURRENTRX
OS_SPHERE: +1.25
OD_OVR_VA: 20/
OS_OVR_VA: 20/
OD_AXIS: 000
OD_AXIS: 000
OS_OVR_VA: 20/
OD_OVR_VA: 20/
OS_CYLINDER: -0.50
OS_ADD: +2.25
OS_VPRISM_DIRECTION: SV
OS_VPRISM_DIRECTION: SV
OS_SPHERE: +4.00
OD_CYLINDER: 0.00
OD_SPHERE: +1.25
OD_SPHERE: +4.00
OD_SPHERE: +1.50
OS_OVR_VA: 20/
OD_ADD: +2.25
OD_CYLINDER: 0.00
OS_VPRISM_DIRECTION: PROGS
OD_VPRISM_DIRECTION: SV
OS_SPHERE: +1.50
OD_VPRISM_DIRECTION: PROGS
OD_OVR_VA: 20/
OS_AXIS: 000
OD_VPRISM_DIRECTION: SV
OS_CYLINDER: 0.00
OS_AXIS: 103

## 2025-08-13 ASSESSMENT — SUPERFICIAL PUNCTATE KERATITIS (SPK)
OS_SPK: T
OD_SPK: T

## 2025-08-13 ASSESSMENT — REFRACTION_AUTOREFRACTION
OD_CYLINDER: -0.25
OS_CYLINDER: -0.75
OD_AXIS: 018
OD_SPHERE: +2.25
OS_AXIS: 116
OS_SPHERE: +2.75

## 2025-08-13 ASSESSMENT — TONOMETRY
OS_IOP_MMHG: 15
OD_IOP_MMHG: 14

## 2025-08-13 ASSESSMENT — CONFRONTATIONAL VISUAL FIELD TEST (CVF)
OD_FINDINGS: FULL
OS_FINDINGS: FULL

## 2025-08-13 ASSESSMENT — VISUAL ACUITY
OS_BCVA: 20/80
OD_BCVA: 20/100

## 2025-08-13 ASSESSMENT — TEAR BREAK UP TIME (TBUT)
OD_TBUT: 1SEC
OS_TBUT: 1SEC